# Patient Record
Sex: MALE | Race: WHITE | Employment: FULL TIME | URBAN - METROPOLITAN AREA
[De-identification: names, ages, dates, MRNs, and addresses within clinical notes are randomized per-mention and may not be internally consistent; named-entity substitution may affect disease eponyms.]

---

## 2017-04-05 RX ORDER — LEVOCETIRIZINE DIHYDROCHLORIDE 5 MG/1
TABLET, FILM COATED ORAL
Qty: 90 TABLET | Refills: 1 | Status: SHIPPED | OUTPATIENT
Start: 2017-04-05 | End: 2017-10-17 | Stop reason: SDUPTHER

## 2017-06-08 ENCOUNTER — OFFICE VISIT (OUTPATIENT)
Dept: FAMILY MEDICINE CLINIC | Age: 52
End: 2017-06-08

## 2017-06-08 VITALS
DIASTOLIC BLOOD PRESSURE: 72 MMHG | WEIGHT: 262 LBS | TEMPERATURE: 97.8 F | RESPIRATION RATE: 16 BRPM | HEIGHT: 70 IN | OXYGEN SATURATION: 98 % | HEART RATE: 48 BPM | BODY MASS INDEX: 37.51 KG/M2 | SYSTOLIC BLOOD PRESSURE: 114 MMHG

## 2017-06-08 DIAGNOSIS — G89.29 CHRONIC PAIN OF RIGHT KNEE: ICD-10-CM

## 2017-06-08 DIAGNOSIS — Z12.11 COLON CANCER SCREENING: ICD-10-CM

## 2017-06-08 DIAGNOSIS — H61.92 SKIN LESION OF LEFT EAR: ICD-10-CM

## 2017-06-08 DIAGNOSIS — Z00.00 WELL ADULT EXAM: Primary | ICD-10-CM

## 2017-06-08 DIAGNOSIS — L20.84 INTRINSIC ECZEMA: ICD-10-CM

## 2017-06-08 DIAGNOSIS — J30.1 SEASONAL ALLERGIC RHINITIS DUE TO POLLEN: ICD-10-CM

## 2017-06-08 DIAGNOSIS — M25.561 CHRONIC PAIN OF RIGHT KNEE: ICD-10-CM

## 2017-06-08 DIAGNOSIS — R00.1 BRADYCARDIA: ICD-10-CM

## 2017-06-08 PROCEDURE — 99396 PREV VISIT EST AGE 40-64: CPT | Performed by: FAMILY MEDICINE

## 2017-06-08 ASSESSMENT — PATIENT HEALTH QUESTIONNAIRE - PHQ9
2. FEELING DOWN, DEPRESSED OR HOPELESS: 0
SUM OF ALL RESPONSES TO PHQ9 QUESTIONS 1 & 2: 0
1. LITTLE INTEREST OR PLEASURE IN DOING THINGS: 0
SUM OF ALL RESPONSES TO PHQ QUESTIONS 1-9: 0

## 2017-10-17 RX ORDER — LEVOCETIRIZINE DIHYDROCHLORIDE 5 MG/1
TABLET, FILM COATED ORAL
Qty: 90 TABLET | Refills: 1 | Status: SHIPPED | OUTPATIENT
Start: 2017-10-17 | End: 2018-05-11 | Stop reason: SDUPTHER

## 2018-05-01 ENCOUNTER — HOSPITAL ENCOUNTER (OUTPATIENT)
Dept: ENDOSCOPY | Age: 53
Discharge: HOME | End: 2018-05-01
Attending: INTERNAL MEDICINE | Admitting: INTERNAL MEDICINE

## 2018-05-01 ASSESSMENT — ENCOUNTER SYMPTOMS: SHORTNESS OF BREATH: 0

## 2018-06-08 ENCOUNTER — TELEPHONE (OUTPATIENT)
Dept: FAMILY MEDICINE CLINIC | Age: 53
End: 2018-06-08

## 2018-06-08 RX ORDER — AZITHROMYCIN 250 MG/1
250 TABLET, FILM COATED ORAL DAILY
Qty: 6 TABLET | Refills: 0 | OUTPATIENT
Start: 2018-06-08 | End: 2018-06-13

## 2018-06-08 RX ORDER — ALBUTEROL SULFATE 90 UG/1
2 AEROSOL, METERED RESPIRATORY (INHALATION) EVERY 4 HOURS PRN
Qty: 1 INHALER | Refills: 0 | OUTPATIENT
Start: 2018-06-08 | End: 2019-05-29

## 2018-06-18 ENCOUNTER — TELEPHONE (OUTPATIENT)
Dept: FAMILY MEDICINE CLINIC | Age: 53
End: 2018-06-18

## 2018-06-18 RX ORDER — LEVOCETIRIZINE DIHYDROCHLORIDE 5 MG/1
TABLET, FILM COATED ORAL
Qty: 90 TABLET | Refills: 0 | Status: SHIPPED | OUTPATIENT
Start: 2018-06-18 | End: 2018-09-23 | Stop reason: SDUPTHER

## 2018-06-19 ENCOUNTER — OFFICE VISIT (OUTPATIENT)
Dept: FAMILY MEDICINE CLINIC | Age: 53
End: 2018-06-19

## 2018-06-19 VITALS
DIASTOLIC BLOOD PRESSURE: 72 MMHG | RESPIRATION RATE: 14 BRPM | BODY MASS INDEX: 36.79 KG/M2 | HEIGHT: 70 IN | HEART RATE: 76 BPM | WEIGHT: 257 LBS | SYSTOLIC BLOOD PRESSURE: 120 MMHG

## 2018-06-19 DIAGNOSIS — E55.9 VITAMIN D DEFICIENCY: ICD-10-CM

## 2018-06-19 DIAGNOSIS — H53.47 HEMIANOPSIA: ICD-10-CM

## 2018-06-19 DIAGNOSIS — J30.1 CHRONIC SEASONAL ALLERGIC RHINITIS DUE TO POLLEN: ICD-10-CM

## 2018-06-19 DIAGNOSIS — R06.83 SNORING: ICD-10-CM

## 2018-06-19 DIAGNOSIS — Z12.5 PROSTATE CANCER SCREENING: ICD-10-CM

## 2018-06-19 DIAGNOSIS — J45.20 MILD INTERMITTENT ASTHMA WITHOUT COMPLICATION: ICD-10-CM

## 2018-06-19 DIAGNOSIS — Z00.00 WELL ADULT EXAM: Primary | ICD-10-CM

## 2018-06-19 LAB — PROSTATE SPECIFIC ANTIGEN: 0.39 NG/ML (ref 0–4)

## 2018-06-19 PROCEDURE — 99396 PREV VISIT EST AGE 40-64: CPT | Performed by: FAMILY MEDICINE

## 2018-06-19 PROCEDURE — 36415 COLL VENOUS BLD VENIPUNCTURE: CPT | Performed by: FAMILY MEDICINE

## 2018-06-19 ASSESSMENT — PATIENT HEALTH QUESTIONNAIRE - PHQ9
2. FEELING DOWN, DEPRESSED OR HOPELESS: 0
SUM OF ALL RESPONSES TO PHQ QUESTIONS 1-9: 0
SUM OF ALL RESPONSES TO PHQ9 QUESTIONS 1 & 2: 0
1. LITTLE INTEREST OR PLEASURE IN DOING THINGS: 0

## 2018-09-24 RX ORDER — LEVOCETIRIZINE DIHYDROCHLORIDE 5 MG/1
TABLET, FILM COATED ORAL
Qty: 90 TABLET | Refills: 3 | Status: SHIPPED | OUTPATIENT
Start: 2018-09-24 | End: 2019-09-19 | Stop reason: SDUPTHER

## 2019-05-29 ENCOUNTER — OFFICE VISIT (OUTPATIENT)
Dept: FAMILY MEDICINE CLINIC | Age: 54
End: 2019-05-29
Payer: COMMERCIAL

## 2019-05-29 VITALS
BODY MASS INDEX: 37.34 KG/M2 | OXYGEN SATURATION: 96 % | HEART RATE: 52 BPM | HEIGHT: 70 IN | SYSTOLIC BLOOD PRESSURE: 140 MMHG | DIASTOLIC BLOOD PRESSURE: 82 MMHG | WEIGHT: 260.8 LBS

## 2019-05-29 DIAGNOSIS — I10 ESSENTIAL HYPERTENSION: Primary | ICD-10-CM

## 2019-05-29 PROCEDURE — G8417 CALC BMI ABV UP PARAM F/U: HCPCS | Performed by: NURSE PRACTITIONER

## 2019-05-29 PROCEDURE — 1036F TOBACCO NON-USER: CPT | Performed by: NURSE PRACTITIONER

## 2019-05-29 PROCEDURE — 3017F COLORECTAL CA SCREEN DOC REV: CPT | Performed by: NURSE PRACTITIONER

## 2019-05-29 PROCEDURE — 99213 OFFICE O/P EST LOW 20 MIN: CPT | Performed by: NURSE PRACTITIONER

## 2019-05-29 PROCEDURE — G8427 DOCREV CUR MEDS BY ELIG CLIN: HCPCS | Performed by: NURSE PRACTITIONER

## 2019-05-29 RX ORDER — HYDROCHLOROTHIAZIDE 25 MG/1
25 TABLET ORAL EVERY MORNING
Qty: 30 TABLET | Refills: 0 | Status: SHIPPED | OUTPATIENT
Start: 2019-05-29 | End: 2019-06-20 | Stop reason: SDUPTHER

## 2019-05-29 ASSESSMENT — PATIENT HEALTH QUESTIONNAIRE - PHQ9
SUM OF ALL RESPONSES TO PHQ QUESTIONS 1-9: 0
2. FEELING DOWN, DEPRESSED OR HOPELESS: 0
SUM OF ALL RESPONSES TO PHQ9 QUESTIONS 1 & 2: 0
1. LITTLE INTEREST OR PLEASURE IN DOING THINGS: 0
SUM OF ALL RESPONSES TO PHQ QUESTIONS 1-9: 0

## 2019-05-29 NOTE — PROGRESS NOTES
SUBJECTIVE:    Patient ID: Richelle Lo is a 48 y.o. y.o. male. HPI   Chief Complaint   Patient presents with    Hypertension     PT STATES HIS BP HAS BEEN RUNNING HIGH ACCORDING TO HIS WIFE,      HE CHECKED HIS B/P AT [facility] AND IT WAS HIGH 156/90 IN January HIS WIFE CHECKED 4/19 AND IT WAS ELEVATED AS WELL- HE HAS NOT HAD ANY C/O CP - HEADACHE - VISION CHANGES - NO REGULAR EXERCISE AND HE KNOWS THAT HE CAN LOSE ABOUT 40 POUNDS AND THAT MAY HELP    Review of Systems   Eyes: Negative for visual disturbance. Cardiovascular: Negative for chest pain, palpitations and leg swelling. Neurological: Negative for headaches. All other systems reviewed and are negative. OBJECTIVE  Vitals:    05/29/19 1454   BP: (!) 140/82   Pulse:    SpO2:         Physical Exam   Constitutional: Vital signs are normal. He appears well-developed and well-nourished. He is active. Cardiovascular: Normal rate, regular rhythm, S1 normal, S2 normal and normal heart sounds. Exam reveals no gallop, no S3, no S4, no distant heart sounds and no friction rub. No murmur heard. No systolic murmur is present. No diastolic murmur is present. Pulmonary/Chest: Effort normal and breath sounds normal.   Neurological: He is alert. Psychiatric: He has a normal mood and affect. His speech is normal and behavior is normal. Judgment and thought content normal. Cognition and memory are normal.     Marcus Mendez was seen today for hypertension. Diagnoses and all orders for this visit:    Essential hypertension  hydrochlorothiazide (HYDRODIURIL) 25 MG tablet;  Take 1 tablet by mouth every morning  PT IS INSTRUCTED TO MONITOR B/P AT  LEAST WEEKLY GOAL /90 OR LESS - IF TRENDING UP HE IS INSTRUCTED TO FOLLOW UP SOONER THAN 4 WEEKS  DASH DIET REVIEWED WITH PT AS WELL HE IS ENCOURAGED TO INCREASE PHYSICAL ACTIVITY TO AT LEAST 30 MIN  4-5 X WEEKLY

## 2019-05-29 NOTE — PATIENT INSTRUCTIONS
Patient Education        DASH Diet: Care Instructions  Your Care Instructions    The DASH diet is an eating plan that can help lower your blood pressure. DASH stands for Dietary Approaches to Stop Hypertension. Hypertension is high blood pressure. The DASH diet focuses on eating foods that are high in calcium, potassium, and magnesium. These nutrients can lower blood pressure. The foods that are highest in these nutrients are fruits, vegetables, low-fat dairy products, nuts, seeds, and legumes. But taking calcium, potassium, and magnesium supplements instead of eating foods that are high in those nutrients does not have the same effect. The DASH diet also includes whole grains, fish, and poultry. The DASH diet is one of several lifestyle changes your doctor may recommend to lower your high blood pressure. Your doctor may also want you to decrease the amount of sodium in your diet. Lowering sodium while following the DASH diet can lower blood pressure even further than just the DASH diet alone. Follow-up care is a key part of your treatment and safety. Be sure to make and go to all appointments, and call your doctor if you are having problems. It's also a good idea to know your test results and keep a list of the medicines you take. How can you care for yourself at home? Following the DASH diet  · Eat 4 to 5 servings of fruit each day. A serving is 1 medium-sized piece of fruit, ½ cup chopped or canned fruit, 1/4 cup dried fruit, or 4 ounces (½ cup) of fruit juice. Choose fruit more often than fruit juice. · Eat 4 to 5 servings of vegetables each day. A serving is 1 cup of lettuce or raw leafy vegetables, ½ cup of chopped or cooked vegetables, or 4 ounces (½ cup) of vegetable juice. Choose vegetables more often than vegetable juice. · Get 2 to 3 servings of low-fat and fat-free dairy each day. A serving is 8 ounces of milk, 1 cup of yogurt, or 1 ½ ounces of cheese. · Eat 6 to 8 servings of grains each day. meals using beans and peas. Add garbanzo or kidney beans to salads. Make burritos and tacos with mashed salas beans or black beans. Where can you learn more? Go to https://chpedavideb.[employer]. org and sign in to your [employer] account. Enter F270 in the [employer] box to learn more about \"DASH Diet: Care Instructions. \"     If you do not have an account, please click on the \"Sign Up Now\" link. Current as of: July 22, 2018  Content Version: 12.0  © 2328-8340 [employer]. Care instructions adapted under license by [employer] ([facility]). If you have questions about a medical condition or this instruction, always ask your healthcare professional. [address] any warranty or liability for your use of this information. Patient Education         High Blood Pressure: The DASH Diet (02:03)  Your health professional recommends that you watch this short online health video. Learn how the DASH eating plan can help lower your blood pressure. How to watch the video    Scan the QR code   OR Visit the website    [email or web address]-umusi. se/r/Mx9gff369yltq   Current as of: July 22, 2018  Content Version: 12.0  © 2006-2019 [employer]. Care instructions adapted under license by [employer] ([facility]). If you have questions about a medical condition or this instruction, always ask your healthcare professional. [address] any warranty or liability for your use of this information.

## 2019-06-20 RX ORDER — HYDROCHLOROTHIAZIDE 25 MG/1
TABLET ORAL
Qty: 30 TABLET | Refills: 0 | Status: SHIPPED | OUTPATIENT
Start: 2019-06-20 | End: 2020-05-29 | Stop reason: ALTCHOICE

## 2019-06-26 ENCOUNTER — OFFICE VISIT (OUTPATIENT)
Dept: FAMILY MEDICINE CLINIC | Age: 54
End: 2019-06-26
Payer: COMMERCIAL

## 2019-06-26 VITALS
RESPIRATION RATE: 18 BRPM | WEIGHT: 251 LBS | DIASTOLIC BLOOD PRESSURE: 64 MMHG | SYSTOLIC BLOOD PRESSURE: 100 MMHG | HEIGHT: 70 IN | HEART RATE: 50 BPM | BODY MASS INDEX: 35.93 KG/M2

## 2019-06-26 DIAGNOSIS — R03.0 ELEVATED BP WITHOUT DIAGNOSIS OF HYPERTENSION: Primary | ICD-10-CM

## 2019-06-26 DIAGNOSIS — J30.1 ALLERGIC RHINITIS DUE TO POLLEN, UNSPECIFIED SEASONALITY: ICD-10-CM

## 2019-06-26 DIAGNOSIS — N52.02 CORPORO-VENOUS OCCLUSIVE ERECTILE DYSFUNCTION: ICD-10-CM

## 2019-06-26 PROCEDURE — G8417 CALC BMI ABV UP PARAM F/U: HCPCS | Performed by: FAMILY MEDICINE

## 2019-06-26 PROCEDURE — 1036F TOBACCO NON-USER: CPT | Performed by: FAMILY MEDICINE

## 2019-06-26 PROCEDURE — 3017F COLORECTAL CA SCREEN DOC REV: CPT | Performed by: FAMILY MEDICINE

## 2019-06-26 PROCEDURE — 99213 OFFICE O/P EST LOW 20 MIN: CPT | Performed by: FAMILY MEDICINE

## 2019-06-26 PROCEDURE — G8428 CUR MEDS NOT DOCUMENT: HCPCS | Performed by: FAMILY MEDICINE

## 2019-06-26 NOTE — PROGRESS NOTES
Subjective:      Patient ID: Maegan Ceballos is a 48 y.o. male. HPI  Chief Complaint   Patient presents with    Hypertension     1 MO ELEVATED BLOOD PRESSURE ROUTINE FOLLOW UP      Gone for 1 week to [employer]  - scuba diving in [facility]  Allergies good control - taking xyzal daily  On hctz for bp  BP Readings from Last 3 Encounters:   06/26/19 100/64   05/29/19 (!) 140/82   06/19/18 120/72     Pulse Readings from Last 3 Encounters:   06/26/19 50   05/29/19 52   06/19/18 76     Wt Readings from Last 3 Encounters:   06/26/19 251 lb (113.9 kg)   05/29/19 260 lb 12.8 oz (118.3 kg)   06/19/18 257 lb (116.6 kg)   started on hctz for bp  Exercising daily -   Interested in [address] [place] [address],[address] for ED - recent issue - feels like needs help  Review of Systems    Objective:   Physical Exam   Constitutional: He appears well-developed. No distress. HENT:   Mouth/Throat: Oropharynx is clear and moist.   Eyes: Conjunctivae are normal. No scleral icterus. Cardiovascular: Normal rate, regular rhythm and normal heart sounds. Exam reveals no gallop. No murmur heard. Pulmonary/Chest: Effort normal and breath sounds normal. No respiratory distress. He has no wheezes. He has no rhonchi. He has no rales. Abdominal: Soft. Bowel sounds are normal. He exhibits no distension. There is no tenderness. Musculoskeletal: He exhibits no edema. Neurological: He is alert. Skin: Skin is intact. No rash noted. No erythema. Psychiatric: He has a normal mood and affect. Assessment:       Diagnosis Orders   1. Elevated BP without diagnosis of hypertension     2. Corporo-venous occlusive erectile dysfunction     3.  Allergic rhinitis due to pollen, unspecified seasonality             Plan:      Monitor bp - precautions d/ w pt including low salt/ caffeine moderation/ diet/ exercise w/ low fat / increased fruit / veggies d/w pt  Lifestyle changes d/w pt  Hold on hctz  F/u in next 3 months w/ bp readings - or at PE later in year as long as bp stays <140/90 at rest  xyzal for allergies working well  viagra rx handwritten 2/2 to computer freezing  Se dw pt        Tino Schuster MD

## 2019-09-19 RX ORDER — LEVOCETIRIZINE DIHYDROCHLORIDE 5 MG/1
TABLET, FILM COATED ORAL
Qty: 90 TABLET | Refills: 1 | Status: SHIPPED | OUTPATIENT
Start: 2019-09-19 | End: 2020-04-09 | Stop reason: SDUPTHER

## 2020-04-13 RX ORDER — LEVOCETIRIZINE DIHYDROCHLORIDE 5 MG/1
5 TABLET, FILM COATED ORAL NIGHTLY
Qty: 90 TABLET | Refills: 0 | Status: SHIPPED | OUTPATIENT
Start: 2020-04-13 | End: 2020-07-08

## 2020-05-29 ENCOUNTER — TELEMEDICINE (OUTPATIENT)
Dept: FAMILY MEDICINE CLINIC | Age: 55
End: 2020-05-29
Payer: COMMERCIAL

## 2020-05-29 VITALS — TEMPERATURE: 97.4 F | BODY MASS INDEX: 35.79 KG/M2 | WEIGHT: 250 LBS | HEIGHT: 70 IN

## 2020-05-29 PROCEDURE — G8427 DOCREV CUR MEDS BY ELIG CLIN: HCPCS | Performed by: NURSE PRACTITIONER

## 2020-05-29 PROCEDURE — 3017F COLORECTAL CA SCREEN DOC REV: CPT | Performed by: NURSE PRACTITIONER

## 2020-05-29 PROCEDURE — 99213 OFFICE O/P EST LOW 20 MIN: CPT | Performed by: NURSE PRACTITIONER

## 2020-05-29 RX ORDER — SILDENAFIL 100 MG/1
1 TABLET, FILM COATED ORAL DAILY PRN
COMMUNITY
Start: 2020-04-06 | End: 2020-11-04 | Stop reason: SDUPTHER

## 2020-05-29 RX ORDER — CEPHALEXIN 500 MG/1
500 CAPSULE ORAL 2 TIMES DAILY
Qty: 14 CAPSULE | Refills: 0 | Status: SHIPPED | OUTPATIENT
Start: 2020-05-29 | End: 2020-06-05

## 2020-05-29 ASSESSMENT — PATIENT HEALTH QUESTIONNAIRE - PHQ9
SUM OF ALL RESPONSES TO PHQ QUESTIONS 1-9: 0
1. LITTLE INTEREST OR PLEASURE IN DOING THINGS: 0
SUM OF ALL RESPONSES TO PHQ QUESTIONS 1-9: 0
SUM OF ALL RESPONSES TO PHQ9 QUESTIONS 1 & 2: 0
2. FEELING DOWN, DEPRESSED OR HOPELESS: 0

## 2020-07-08 RX ORDER — LEVOCETIRIZINE DIHYDROCHLORIDE 5 MG/1
TABLET, FILM COATED ORAL
Qty: 90 TABLET | Refills: 0 | Status: SHIPPED | OUTPATIENT
Start: 2020-07-08 | End: 2020-11-04 | Stop reason: SDUPTHER

## 2020-10-06 RX ORDER — LEVOCETIRIZINE DIHYDROCHLORIDE 5 MG/1
TABLET, FILM COATED ORAL
Qty: 90 TABLET | Refills: 0 | OUTPATIENT
Start: 2020-10-06

## 2020-10-06 NOTE — TELEPHONE ENCOUNTER
LAST VISIT 05/29/2020. NEXT VISIT NONE. PT IS DUE FOR AN APPT     CALLED AND SPOKE WITH PT. HE STATED THAT HE DOES NOT NEED AT THIS TIME.  HS

## 2020-11-04 ENCOUNTER — OFFICE VISIT (OUTPATIENT)
Dept: FAMILY MEDICINE CLINIC | Age: 55
End: 2020-11-04
Payer: COMMERCIAL

## 2020-11-04 VITALS
BODY MASS INDEX: 37.42 KG/M2 | SYSTOLIC BLOOD PRESSURE: 118 MMHG | HEIGHT: 70 IN | DIASTOLIC BLOOD PRESSURE: 66 MMHG | OXYGEN SATURATION: 98 % | HEART RATE: 47 BPM | TEMPERATURE: 98.2 F | WEIGHT: 261.4 LBS

## 2020-11-04 PROCEDURE — 90471 IMMUNIZATION ADMIN: CPT | Performed by: NURSE PRACTITIONER

## 2020-11-04 PROCEDURE — 90686 IIV4 VACC NO PRSV 0.5 ML IM: CPT | Performed by: NURSE PRACTITIONER

## 2020-11-04 PROCEDURE — 99396 PREV VISIT EST AGE 40-64: CPT | Performed by: NURSE PRACTITIONER

## 2020-11-04 RX ORDER — LEVOCETIRIZINE DIHYDROCHLORIDE 5 MG/1
TABLET, FILM COATED ORAL
Qty: 90 TABLET | Refills: 3 | Status: SHIPPED | OUTPATIENT
Start: 2020-11-04 | End: 2021-11-03 | Stop reason: SDUPTHER

## 2020-11-04 RX ORDER — SILDENAFIL 100 MG/1
100 TABLET, FILM COATED ORAL DAILY PRN
Qty: 30 TABLET | Refills: 1 | Status: SHIPPED | OUTPATIENT
Start: 2020-11-04 | End: 2021-11-03 | Stop reason: SDUPTHER

## 2020-11-04 NOTE — PROGRESS NOTES
Vaccine Information Sheet, \"Influenza - Inactivated\"  given to Chepe Pope, or parent/legal guardian of  Chepe Pope and verbalized understanding. Patient responses:    Have you ever had a reaction to a flu vaccine? No  Do you have any current illness? No  Have you ever had Guillian [place] Syndrome? No  Do you have a serious allergy to any of the follow: Neomycin, Polymyxin, Thimerosal, eggs or egg products? No    Flu vaccine given per order. Please see immunization tab. Risks and benefits explained. Current VIS given.       Immunizations Administered     Name Date Dose Route    Influenza, Quadv, IM, PF (6 mo and older Fluzone, Flulaval, Fluarix, and 3 yrs and older Afluria) 11/4/2020 0.5 mL Intramuscular    Site: Deltoid- Left    Lot: [id number]    NDC: 70092-550-50

## 2020-11-04 NOTE — PROGRESS NOTES
2020    Christian Chung (:  1965) is a 50 y.o. male, here for a preventive medicine evaluation. Leslie Laird is here for routine physical. Denies any complaints. He works as an  for [employer]. They complete lab work for insurance at work every year. Continues to use Xyzal for allergies as well as sildenafil. Patient Active Problem List   Diagnosis    Eczema    Allergic rhinitis    Knee pain, right    Ulcer    Scrotal skin lesion    Bradycardia    Tear of meniscus of knee       Review of Systems   Constitutional: Negative for chills and fever. HENT: Negative for ear discharge, ear pain, hearing loss, sinus pressure, sinus pain and sore throat. Eyes: Negative for pain, discharge and redness. Respiratory: Negative for cough, shortness of breath and wheezing. Cardiovascular: Negative for chest pain and palpitations. Gastrointestinal: Negative for abdominal distention, abdominal pain, diarrhea, nausea and vomiting. Genitourinary: Negative for dysuria and hematuria. Musculoskeletal: Negative for myalgias. Skin: Negative for rash. Neurological: Negative for weakness, numbness and headaches. Prior to Visit Medications    Medication Sig Taking? Authorizing Provider   levocetirizine (XYZAL) 5 MG tablet TAKE 1 TABLET BY MOUTH EVERY DAY AT NIGHT Yes TALIA Mcallister CNP   sildenafil (VIAGRA) 100 MG tablet Take 1 tablet by mouth daily as needed for Erectile Dysfunction Yes TALIA Mcallister CNP        No Known Allergies    Past Medical History:   Diagnosis Date    Colon polyp 2018    COLONOSCOPY, DR DEDE JEONG, SIGMOID COLON POLYP:  COLONIC MUCOSA WITH LYMPHOCYTIC AGGREGATES.  Rectal polyp 2018    COLONOSCOPY, DR DEDE JEONG, SIGMOID COLON POLYP:  COLONIC MUCOSA WITH LYMPHOCYTIC AGGREGATES. RECTUM POLYP:  MUCOSAL PROLAPSE POLYP.     Ulcer     stomach       Past Surgical History:   Procedure Laterality Date    COLONOSCOPY  2018 COLONOSCOPY, DR DEDE JEONG, SIGMOID COLON POLYP:  COLONIC MUCOSA WITH LYMPHOCYTIC AGGREGATES. RECTUM POLYP:  MUCOSAL PROLAPSE POLYP.    EYE SURGERY      vision correction surgery: lazy eye    KNEE ARTHROSCOPY  2012    right knee    TONSILLECTOMY      as a child        Social History     Socioeconomic History    Marital status:      Spouse name: Not on file    Number of children: Not on file    Years of education: Not on file    Highest education level: Not on file   Occupational History    Not on file   Social Needs    Financial resource strain: Not on file    Food insecurity     Worry: Not on file     Inability: Not on file    Transportation needs     Medical: Not on file     Non-medical: Not on file   Tobacco Use    Smoking status: Former Smoker     Packs/day: 1.00     Years: 10.00     Pack years: 10.00     Types: Cigarettes    Smokeless tobacco: Never Used    Tobacco comment: Quit 20 years ago; Educate not to restart. Substance and Sexual Activity    Alcohol use:  Yes     Alcohol/week: 0.0 standard drinks     Comment: occasionally     Drug use: No    Sexual activity: Not on file   Lifestyle    Physical activity     Days per week: Not on file     Minutes per session: Not on file    Stress: Not on file   Relationships    Social connections     Talks on phone: Not on file     Gets together: Not on file     Attends Orthodoxy service: Not on file     Active member of club or organization: Not on file     Attends meetings of clubs or organizations: Not on file     Relationship status: Not on file    Intimate partner violence     Fear of current or ex partner: Not on file     Emotionally abused: Not on file     Physically abused: Not on file     Forced sexual activity: Not on file   Other Topics Concern    Not on file   Social History Narrative    Not on file        Family History   Problem Relation Age of Onset    Diabetes Mother     Asthma Mother     Diabetes Father     Heart Attack Maternal Grandmother     Heart Attack Maternal Grandfather     Cancer Paternal Grandmother         lung    Cancer Paternal Grandfather         pancreatic       ADVANCE DIRECTIVE: N, <no information>    Vitals:    11/04/20 1616   BP: 118/66   Site: Left Upper Arm   Position: Sitting   Cuff Size: Large Adult   Pulse: (!) 47   Temp: 98.2 °F (36.8 °C)   TempSrc: Tympanic   SpO2: 98%   Weight: 261 lb 6.4 oz (118.6 kg)  Comment: SHOES ON   Height: 5' 10\" (1.778 m)     Estimated body mass index is 37.51 kg/m² as calculated from the following:    Height as of this encounter: 5' 10\" (1.778 m). Weight as of this encounter: 261 lb 6.4 oz (118.6 kg). Physical Exam  Vitals signs reviewed. Constitutional:       Appearance: Normal appearance. He is obese. HENT:      Head: Normocephalic and atraumatic. Right Ear: Tympanic membrane, ear canal and external ear normal.      Left Ear: Tympanic membrane, ear canal and external ear normal.      Nose: Nose normal.      Mouth/Throat:      Mouth: Mucous membranes are moist.      Pharynx: Oropharynx is clear. No posterior oropharyngeal erythema. Eyes:      General: No scleral icterus. Right eye: No discharge. Left eye: No discharge. Extraocular Movements: Extraocular movements intact. Pupils: Pupils are equal, round, and reactive to light. Neck:      Musculoskeletal: Normal range of motion and neck supple. Cardiovascular:      Rate and Rhythm: Normal rate and regular rhythm. Pulses: Normal pulses. Heart sounds: Normal heart sounds. Pulmonary:      Effort: Pulmonary effort is normal.      Breath sounds: Normal breath sounds. No wheezing. Abdominal:      General: Bowel sounds are normal.      Palpations: Abdomen is soft. Tenderness: There is no abdominal tenderness. There is no guarding or rebound. Musculoskeletal: Normal range of motion. Skin:     General: Skin is warm and dry.       Capillary Refill: Capillary refill takes less than 2 seconds. Neurological:      Mental Status: He is alert and oriented to person, place, and time. Mental status is at baseline. Psychiatric:         Mood and Affect: Mood normal.         Behavior: Behavior normal.         No flowsheet data found. No results found for: CHOL, CHOLFAST, TRIG, TRIGLYCFAST, HDL, LDLCHOLESTEROL, LDLCALC, GLUF, GLUCOSE, LABA1C    The ASCVD Risk score (Darcy Cerna, et al., 2013) failed to calculate for the following reasons:    Cannot find a previous HDL lab    Cannot find a previous total cholesterol lab    Immunization History   Administered Date(s) Administered    Influenza Vaccine, unspecified formulation 11/21/2016    Influenza Virus Vaccine 11/01/2013, 10/03/2014, 11/21/2016    Influenza Whole 11/01/2013, 10/03/2014    Influenza, Quadv, IM, PF (6 mo and older Fluzone, Flulaval, Fluarix, and 3 yrs and older Afluria) 11/04/2020    Tdap (Boostrix, Adacel) 04/11/2014       Health Maintenance   Topic Date Due    HIV screen  08/26/1982    Lipid screen  08/26/2007    Diabetes screen  08/26/2007    Shingles Vaccine (1 of 2) 08/26/2017    DTaP/Tdap/Td vaccine (2 - Td) 04/11/2024    Colon cancer screen colonoscopy  05/01/2028    Flu vaccine  Completed    Hepatitis A vaccine  Aged Out    Hepatitis B vaccine  Aged Out    Hib vaccine  Aged Out    Meningococcal (ACWY) vaccine  Aged Out    Pneumococcal 0-64 years Vaccine  Aged Out       ASSESSMENT/PLAN:  1. Annual visit for general adult medical examination without abnormal findings  - Normal exam    2. Need for influenza vaccination  - INFLUENZA, QUADV, 3 YRS AND OLDER, IM PF, PREFILL SYR OR SDV, 0.5ML (AFLURIA QUADV, PF)    3. Seasonal allergic rhinitis, unspecified trigger  - levocetirizine (XYZAL) 5 MG tablet; TAKE 1 TABLET BY MOUTH EVERY DAY AT NIGHT  Dispense: 90 tablet; Refill: 3    4. Erectile dysfunction, unspecified erectile dysfunction type  - sildenafil (VIAGRA) 100 MG tablet;  Take 1 tablet by mouth daily as needed for Erectile Dysfunction  Dispense: 30 tablet; Refill: 1      Return in about 1 year (around 11/4/2021) for Annual Physical.    An electronic signature was used to authenticate this note.     --TALIA Diaz - CNP on 11/5/2020 at 7:33 AM

## 2020-11-05 ASSESSMENT — ENCOUNTER SYMPTOMS
DIARRHEA: 0
SHORTNESS OF BREATH: 0
WHEEZING: 0
ABDOMINAL PAIN: 0
SINUS PAIN: 0
EYE REDNESS: 0
EYE PAIN: 0
COUGH: 0
NAUSEA: 0
ABDOMINAL DISTENTION: 0
EYE DISCHARGE: 0
SORE THROAT: 0
SINUS PRESSURE: 0
VOMITING: 0

## 2021-07-19 ENCOUNTER — E-VISIT (OUTPATIENT)
Dept: FAMILY MEDICINE CLINIC | Age: 56
End: 2021-07-19
Payer: COMMERCIAL

## 2021-07-19 DIAGNOSIS — H10.32 ACUTE CONJUNCTIVITIS OF LEFT EYE, UNSPECIFIED ACUTE CONJUNCTIVITIS TYPE: Primary | ICD-10-CM

## 2021-07-19 PROCEDURE — 98971 NQHP OL DIG ASSMT&MGMT 11-20: CPT | Performed by: NURSE PRACTITIONER

## 2021-07-19 RX ORDER — POLYMYXIN B SULFATE AND TRIMETHOPRIM 1; 10000 MG/ML; [USP'U]/ML
1 SOLUTION OPHTHALMIC EVERY 4 HOURS
Qty: 10 ML | Refills: 0 | Status: SHIPPED | OUTPATIENT
Start: 2021-07-19 | End: 2021-07-29

## 2021-11-03 ENCOUNTER — TELEPHONE (OUTPATIENT)
Dept: FAMILY MEDICINE CLINIC | Age: 56
End: 2021-11-03

## 2021-11-03 ENCOUNTER — OFFICE VISIT (OUTPATIENT)
Dept: FAMILY MEDICINE CLINIC | Age: 56
End: 2021-11-03
Payer: COMMERCIAL

## 2021-11-03 VITALS
SYSTOLIC BLOOD PRESSURE: 124 MMHG | BODY MASS INDEX: 37.94 KG/M2 | RESPIRATION RATE: 20 BRPM | OXYGEN SATURATION: 95 % | HEART RATE: 91 BPM | HEIGHT: 70 IN | DIASTOLIC BLOOD PRESSURE: 82 MMHG | WEIGHT: 265 LBS

## 2021-11-03 DIAGNOSIS — N52.9 ERECTILE DYSFUNCTION, UNSPECIFIED ERECTILE DYSFUNCTION TYPE: ICD-10-CM

## 2021-11-03 DIAGNOSIS — J30.2 SEASONAL ALLERGIC RHINITIS, UNSPECIFIED TRIGGER: ICD-10-CM

## 2021-11-03 DIAGNOSIS — Z23 NEED FOR IMMUNIZATION AGAINST INFLUENZA: ICD-10-CM

## 2021-11-03 DIAGNOSIS — Z23 NEED FOR SHINGLES VACCINE: ICD-10-CM

## 2021-11-03 DIAGNOSIS — Z00.00 ANNUAL VISIT FOR GENERAL ADULT MEDICAL EXAMINATION WITHOUT ABNORMAL FINDINGS: Primary | ICD-10-CM

## 2021-11-03 PROCEDURE — 90471 IMMUNIZATION ADMIN: CPT | Performed by: NURSE PRACTITIONER

## 2021-11-03 PROCEDURE — 99396 PREV VISIT EST AGE 40-64: CPT | Performed by: NURSE PRACTITIONER

## 2021-11-03 PROCEDURE — 90674 CCIIV4 VAC NO PRSV 0.5 ML IM: CPT | Performed by: NURSE PRACTITIONER

## 2021-11-03 PROCEDURE — G8482 FLU IMMUNIZE ORDER/ADMIN: HCPCS | Performed by: NURSE PRACTITIONER

## 2021-11-03 PROCEDURE — 90750 HZV VACC RECOMBINANT IM: CPT | Performed by: NURSE PRACTITIONER

## 2021-11-03 PROCEDURE — 90472 IMMUNIZATION ADMIN EACH ADD: CPT | Performed by: NURSE PRACTITIONER

## 2021-11-03 RX ORDER — FLUTICASONE PROPIONATE 50 MCG
2 SPRAY, SUSPENSION (ML) NASAL DAILY
Qty: 16 G | Refills: 0 | Status: SHIPPED | OUTPATIENT
Start: 2021-11-03 | End: 2021-11-29

## 2021-11-03 RX ORDER — LEVOCETIRIZINE DIHYDROCHLORIDE 5 MG/1
TABLET, FILM COATED ORAL
Qty: 90 TABLET | Refills: 3 | Status: SHIPPED | OUTPATIENT
Start: 2021-11-03 | End: 2022-03-14

## 2021-11-03 RX ORDER — SILDENAFIL 100 MG/1
100 TABLET, FILM COATED ORAL DAILY PRN
Qty: 30 TABLET | Refills: 1 | Status: SHIPPED | OUTPATIENT
Start: 2021-11-03

## 2021-11-03 ASSESSMENT — ENCOUNTER SYMPTOMS
EYE PAIN: 0
VOMITING: 0
NAUSEA: 0
EYE DISCHARGE: 0
ABDOMINAL PAIN: 0
COUGH: 0
EYE REDNESS: 0
SINUS PAIN: 0
SINUS PRESSURE: 0
WHEEZING: 0
ABDOMINAL DISTENTION: 0
SORE THROAT: 0
DIARRHEA: 0
SHORTNESS OF BREATH: 0

## 2021-11-03 ASSESSMENT — PATIENT HEALTH QUESTIONNAIRE - PHQ9
SUM OF ALL RESPONSES TO PHQ9 QUESTIONS 1 & 2: 0
SUM OF ALL RESPONSES TO PHQ QUESTIONS 1-9: 0
2. FEELING DOWN, DEPRESSED OR HOPELESS: 0
SUM OF ALL RESPONSES TO PHQ QUESTIONS 1-9: 0
SUM OF ALL RESPONSES TO PHQ QUESTIONS 1-9: 0
1. LITTLE INTEREST OR PLEASURE IN DOING THINGS: 0

## 2021-11-03 NOTE — PROGRESS NOTES
Lilia Bonilla (:  1965) is a 49 y.o. male,Established patient, here for evaluation of the following chief complaint(s): Annual Exam (WORK PHYSICAL) and Other (PT NOT FASTING FOR BLOOD WORK TODAY)      ASSESSMENT/PLAN:  1. Annual visit for general adult medical examination without abnormal findings  -Normal exam today  -Requested that patient send lab results from [facility] once they are completed  -Shingles and flu vaccines today  -Refill medications. Continue as ordered  -Follow-up in 1 year for physical, or sooner if needed  2. Seasonal allergic rhinitis, unspecified trigger  -Typically controlled with Xyzal.  Refill today. Flonase is being sent for when ear congestion develops. Educated on medication use as well as side effects. Follow-up as needed. -     levocetirizine (XYZAL) 5 MG tablet; TAKE 1 TABLET BY MOUTH EVERY DAY AT NIGHT, Disp-90 tablet, R-3Normal  -     fluticasone (FLONASE) 50 MCG/ACT nasal spray; 2 sprays by Each Nostril route daily, Disp-16 g, R-0Normal  3. Erectile dysfunction, unspecified erectile dysfunction type  -Controlled on sildenafil. Refill today. -     sildenafil (VIAGRA) 100 MG tablet; Take 1 tablet by mouth daily as needed for Erectile Dysfunction, Disp-30 tablet, R-1Normal  4. Need for immunization against influenza  -     INFLUENZA, MDCK QUADV, 2 YRS AND OLDER, IM, PF, PREFILL SYR OR SDV, 0.5ML (FLUCELVAX QUADV, PF)  5. Need for shingles vaccine  -     Zoster Subunit [facility])      Return in about 1 year (around 11/3/2022), or Shingles #2 in 2-6 months, for Annual Physical.    SUBJECTIVE/OBJECTIVE:  AMAYA  José Antonio Meier presents today for his annual physical.  Denies any particular complaints today. States that medications continue to work well for him. He does state that despite the Xyzal he will occasionally get congestion in his ears easily. He states this especially happens when he goes on a snorkeling trip swollen goes to a place where the weather changes drastically. equal, round, and reactive to light. Cardiovascular:      Rate and Rhythm: Normal rate and regular rhythm. Pulses: Normal pulses. Heart sounds: Normal heart sounds. Pulmonary:      Effort: Pulmonary effort is normal.      Breath sounds: Normal breath sounds. No wheezing. Abdominal:      General: Bowel sounds are normal.      Palpations: Abdomen is soft. Tenderness: There is no abdominal tenderness. There is no guarding or rebound. Musculoskeletal:         General: Normal range of motion. Cervical back: Normal range of motion and neck supple. Skin:     General: Skin is warm and dry. Capillary Refill: Capillary refill takes less than 2 seconds. Neurological:      Mental Status: He is alert and oriented to person, place, and time. Mental status is at baseline. Psychiatric:         Mood and Affect: Mood normal.         Behavior: Behavior normal.         Thought Content: Thought content normal.         Judgment: Judgment normal.               This dictation was generated by voice recognition computer software. Although all attempts are made to edit the dictation for accuracy, there may be errors in the transcription that are not intended. An electronic signature was used to authenticate this note.     --TALIA Cotter - CNP

## 2021-11-03 NOTE — PROGRESS NOTES
Vaccine Information Sheet, \"Influenza - Inactivated\"  given to Derryl Hatchet, or parent/legal guardian of  Derryl Hatchet and verbalized understanding. Patient responses:    Have you ever had a reaction to a flu vaccine? No  Do you have any current illness? No  Have you ever had Guillian [place] Syndrome? No  Do you have a serious allergy to any of the follow: Neomycin, Polymyxin, Thimerosal, eggs or egg products? No    Flu vaccine given per order. Please see immunization tab. Risks and benefits explained. Current VIS given.       Immunizations Administered     Name Date Dose Route    Influenza, MDCK Quadv, IM, PF (Flucelvax 2 yrs and older) 11/3/2021 0.5 mL Intramuscular    Site: Deltoid- Left    Lot: [id number]    NDC: 27426-010-61    Zoster Recombinant (Shingrix) 11/3/2021 0.5 mL Intramuscular    Site: Right arm    Lot: [id number]    NDC: 66155-110-38

## 2021-11-03 NOTE — PATIENT INSTRUCTIONS
Patient Education        Well Visit, Men 48 to 72: Care Instructions  Overview     Well visits can help you stay healthy. Your doctor has checked your overall health and may have suggested ways to take good care of yourself. Your doctor also may have recommended tests. At home, you can help prevent illness with healthy eating, regular exercise, and other steps. Follow-up care is a key part of your treatment and safety. Be sure to make and go to all appointments, and call your doctor if you are having problems. It's also a good idea to know your test results and keep a list of the medicines you take. How can you care for yourself at home? · Get screening tests that you and your doctor decide on. Screening helps find diseases before any symptoms appear. · Eat healthy foods. Choose fruits, vegetables, whole grains, protein, and low-fat dairy foods. Limit fat, especially saturated fat. Reduce salt in your diet. · Limit alcohol. Have no more than 2 drinks a day or 14 drinks a week. · Get at least 30 minutes of exercise on most days of the week. Walking is a good choice. You also may want to do other activities, such as running, swimming, cycling, or playing tennis or team sports. · Reach and stay at a healthy weight. This will lower your risk for many problems, such as obesity, diabetes, heart disease, and high blood pressure. · Do not smoke. Smoking can make health problems worse. If you need help quitting, talk to your doctor about stop-smoking programs and medicines. These can increase your chances of quitting for good. · Care for your mental health. It is easy to get weighed down by worry and stress. Learn strategies to manage stress, like deep breathing and mindfulness, and stay connected with your family and community. If you find you often feel sad or hopeless, talk with your doctor. Treatment can help.   · Talk to your doctor about whether you have any risk factors for sexually transmitted infections (STIs). You can help prevent STIs if you wait to have sex with a new partner (or partners) until you've each been tested for STIs. It also helps if you use condoms (male or female condoms) and if you limit your sex partners to one person who only has sex with you. Vaccines are available for some STIs. · If it's important to you to prevent pregnancy with your partner, talk with your doctor about birth control options that might be best for you. · If you think you may have a problem with alcohol or drug use, talk to your doctor. This includes prescription medicines (such as amphetamines and opioids) and illegal drugs (such as cocaine and methamphetamine). Your doctor can help you figure out what type of treatment is best for you. · Protect your skin from too much sun. When you're outdoors from 10 a.m. to 4 p.m., stay in the shade or cover up with clothing and a hat with a wide brim. Wear sunglasses that block UV rays. Even when it's cloudy, put broad-spectrum sunscreen (SPF 30 or higher) on any exposed skin. · See a dentist one or two times a year for checkups and to have your teeth cleaned. · Wear a seat belt in the car. When should you call for help? Watch closely for changes in your health, and be sure to contact your doctor if you have any problems or symptoms that concern you. Where can you learn more? Go to [email or web address][email or web address] org and sign in to your [employer] account. Enter N370 in the [facility] box to learn more about \"Well Visit, Men 48 to 72: Care Instructions. \"     If you do not have an account, please click on the \"Sign Up Now\" link. Current as of: February 11, 2021               Content Version: 13.0  © 1814-4395 Healthwise, Incorporated. Care instructions adapted under license by [facility] ([facility]).  If you have questions about a medical condition or this instruction, always ask your healthcare professional. Shruti Molina disclaims any warranty or liability for your use of this information.

## 2021-11-28 DIAGNOSIS — J30.2 SEASONAL ALLERGIC RHINITIS, UNSPECIFIED TRIGGER: ICD-10-CM

## 2021-11-29 RX ORDER — FLUTICASONE PROPIONATE 50 MCG
SPRAY, SUSPENSION (ML) NASAL
Qty: 16 G | Refills: 0 | Status: SHIPPED | OUTPATIENT
Start: 2021-11-29

## 2022-01-12 ENCOUNTER — NURSE ONLY (OUTPATIENT)
Dept: FAMILY MEDICINE CLINIC | Age: 57
End: 2022-01-12
Payer: COMMERCIAL

## 2022-01-12 DIAGNOSIS — Z23 NEED FOR SHINGLES VACCINE: Primary | ICD-10-CM

## 2022-01-12 PROCEDURE — 90471 IMMUNIZATION ADMIN: CPT | Performed by: FAMILY MEDICINE

## 2022-01-12 PROCEDURE — 90750 HZV VACC RECOMBINANT IM: CPT | Performed by: FAMILY MEDICINE

## 2022-01-12 NOTE — PROGRESS NOTES
Immunizations Administered     Name Date Dose Route    Zoster Recombinant (Shingrix) 1/12/2022 0.5 mL Intramuscular    Site: Deltoid- Left    Lot: [id number]    NDC: 64826-502-33

## 2022-03-12 DIAGNOSIS — J30.2 SEASONAL ALLERGIC RHINITIS, UNSPECIFIED TRIGGER: ICD-10-CM

## 2022-03-14 RX ORDER — LEVOCETIRIZINE DIHYDROCHLORIDE 5 MG/1
TABLET, FILM COATED ORAL
Qty: 90 TABLET | Refills: 3 | Status: SHIPPED | OUTPATIENT
Start: 2022-03-14

## 2022-03-14 NOTE — TELEPHONE ENCOUNTER
LAST VISIT 11/03/2021 WITH HOMER TENA CNP FOR PHYSICAL, NEXT VISIT 11/09/2022 WITH DR MYERS.   [address] [place] [address]

## 2022-11-09 ENCOUNTER — OFFICE VISIT (OUTPATIENT)
Dept: FAMILY MEDICINE CLINIC | Age: 57
End: 2022-11-09
Payer: COMMERCIAL

## 2022-11-09 VITALS
OXYGEN SATURATION: 95 % | HEART RATE: 56 BPM | BODY MASS INDEX: 35.07 KG/M2 | DIASTOLIC BLOOD PRESSURE: 74 MMHG | SYSTOLIC BLOOD PRESSURE: 136 MMHG | WEIGHT: 245 LBS | HEIGHT: 70 IN | RESPIRATION RATE: 20 BRPM

## 2022-11-09 DIAGNOSIS — H60.501 ACUTE OTITIS EXTERNA OF RIGHT EAR, UNSPECIFIED TYPE: ICD-10-CM

## 2022-11-09 DIAGNOSIS — Z11.59 ENCOUNTER FOR HEPATITIS C SCREENING TEST FOR LOW RISK PATIENT: ICD-10-CM

## 2022-11-09 DIAGNOSIS — H91.93 BILATERAL HEARING LOSS, UNSPECIFIED HEARING LOSS TYPE: ICD-10-CM

## 2022-11-09 DIAGNOSIS — Z11.4 ENCOUNTER FOR SCREENING FOR HIV: ICD-10-CM

## 2022-11-09 DIAGNOSIS — Z23 NEED FOR INFLUENZA VACCINATION: ICD-10-CM

## 2022-11-09 DIAGNOSIS — J30.2 SEASONAL ALLERGIC RHINITIS, UNSPECIFIED TRIGGER: ICD-10-CM

## 2022-11-09 DIAGNOSIS — Z00.00 WELL ADULT EXAM: Primary | ICD-10-CM

## 2022-11-09 DIAGNOSIS — E78.00 HYPERCHOLESTEREMIA: ICD-10-CM

## 2022-11-09 DIAGNOSIS — H60.92 RECURRENT OTITIS EXTERNA OF LEFT EAR: ICD-10-CM

## 2022-11-09 DIAGNOSIS — R73.9 HYPERGLYCEMIA: ICD-10-CM

## 2022-11-09 DIAGNOSIS — N52.9 ERECTILE DYSFUNCTION, UNSPECIFIED ERECTILE DYSFUNCTION TYPE: ICD-10-CM

## 2022-11-09 DIAGNOSIS — K63.5 POLYP OF COLON, UNSPECIFIED PART OF COLON, UNSPECIFIED TYPE: ICD-10-CM

## 2022-11-09 PROCEDURE — 90471 IMMUNIZATION ADMIN: CPT | Performed by: FAMILY MEDICINE

## 2022-11-09 PROCEDURE — 99396 PREV VISIT EST AGE 40-64: CPT | Performed by: FAMILY MEDICINE

## 2022-11-09 PROCEDURE — G8484 FLU IMMUNIZE NO ADMIN: HCPCS | Performed by: FAMILY MEDICINE

## 2022-11-09 PROCEDURE — 90674 CCIIV4 VAC NO PRSV 0.5 ML IM: CPT | Performed by: FAMILY MEDICINE

## 2022-11-09 SDOH — ECONOMIC STABILITY: TRANSPORTATION INSECURITY
IN THE PAST 12 MONTHS, HAS LACK OF TRANSPORTATION KEPT YOU FROM MEETINGS, WORK, OR FROM GETTING THINGS NEEDED FOR DAILY LIVING?: NO

## 2022-11-09 SDOH — ECONOMIC STABILITY: FOOD INSECURITY: WITHIN THE PAST 12 MONTHS, YOU WORRIED THAT YOUR FOOD WOULD RUN OUT BEFORE YOU GOT MONEY TO BUY MORE.: NEVER TRUE

## 2022-11-09 SDOH — ECONOMIC STABILITY: FOOD INSECURITY: WITHIN THE PAST 12 MONTHS, THE FOOD YOU BOUGHT JUST DIDN'T LAST AND YOU DIDN'T HAVE MONEY TO GET MORE.: NEVER TRUE

## 2022-11-09 SDOH — ECONOMIC STABILITY: TRANSPORTATION INSECURITY
IN THE PAST 12 MONTHS, HAS THE LACK OF TRANSPORTATION KEPT YOU FROM MEDICAL APPOINTMENTS OR FROM GETTING MEDICATIONS?: NO

## 2022-11-09 ASSESSMENT — PATIENT HEALTH QUESTIONNAIRE - PHQ9
SUM OF ALL RESPONSES TO PHQ QUESTIONS 1-9: 0
SUM OF ALL RESPONSES TO PHQ9 QUESTIONS 1 & 2: 0
SUM OF ALL RESPONSES TO PHQ QUESTIONS 1-9: 0
SUM OF ALL RESPONSES TO PHQ QUESTIONS 1-9: 0
2. FEELING DOWN, DEPRESSED OR HOPELESS: 0
SUM OF ALL RESPONSES TO PHQ QUESTIONS 1-9: 0
1. LITTLE INTEREST OR PLEASURE IN DOING THINGS: 0

## 2022-11-09 ASSESSMENT — SOCIAL DETERMINANTS OF HEALTH (SDOH): HOW HARD IS IT FOR YOU TO PAY FOR THE VERY BASICS LIKE FOOD, HOUSING, MEDICAL CARE, AND HEATING?: NOT HARD AT ALL

## 2022-11-09 NOTE — PROGRESS NOTES
Immunizations Administered       Name Date Dose Route    Influenza, FLUCELVAX, (age 10 mo+), MDCK, PF, 0.5mL 11/9/2022 0.5 mL Intramuscular    Site: Deltoid- Left    Lot: [id number]    NDC: 29764-874-91

## 2022-11-09 NOTE — PROGRESS NOTES
2022    Pavel Rich (:  1965) is a 56 y.o. male, here for a preventive medicine evaluation. Chief Complaint   Patient presents with    Annual Exam     Physical, issues with hearing     Would like Bbloodwork, including A1C. 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 Reduced carbs - less pizza - eating eggs in lieu of cereal and salads for lunch. Using elliptical in am and bikes outside when weather permits  Hearing seems to be down somewhat roland in left ear  Uses otic gtts prn swimmer ear - gets them frequently and gtts help  Vision stable overall - glasses  Sleep good/ mood good  Allergies good as long as taking xyzal - not using flonase lately - doing okay  - uses flonase for scuba diving  Viagra 1/2 tab prn works well  No other vitamins/ supplements -not needed otc analgesics for some time. No gi issues. Parents w/ dm  Sugar high on last at home test  No lightheaded/ urinary changes/ sob/ cp/palp    BP Readings from Last 3 Encounters:   22 136/74   21 124/82   20 118/66     Pulse Readings from Last 3 Encounters:   22 56   21 91   20 (!) 47     Wt Readings from Last 3 Encounters:   22 245 lb (111.1 kg)   21 265 lb (120.2 kg)   20 261 lb 6.4 oz (118.6 kg)       Patient Active Problem List   Diagnosis    Eczema    Allergic rhinitis    Knee pain, right    Ulcer    Scrotal skin lesion    Bradycardia    Tear of meniscus of knee       Review of Systems    Prior to Visit Medications    Medication Sig Taking?  Authorizing Provider   levocetirizine (XYZAL) 5 MG tablet TAKE 1 TABLET BY MOUTH EVERY DAY AT NIGHT Yes TALIA Celis - CNP   sildenafil (VIAGRA) 100 MG tablet Take 1 tablet by mouth daily as needed for Erectile Dysfunction Yes Tracey Cano APRN - CNP   fluticasone (FLONASE) 50 MCG/ACT nasal spray SPRAY 2 SPRAYS INTO EACH NOSTRIL EVERY DAY  Patient not taking: Reported on 2022  Alejandra Tineo APRN - CNP        No Known Allergies    Past Medical History:   Diagnosis Date    Colon polyp 2018    COLONOSCOPY, DR DEDE JEONG, SIGMOID COLON POLYP:  COLONIC MUCOSA WITH LYMPHOCYTIC AGGREGATES. Rectal polyp 2018    COLONOSCOPY, DR DEDE JEONG, SIGMOID COLON POLYP:  COLONIC MUCOSA WITH LYMPHOCYTIC AGGREGATES. RECTUM POLYP:  MUCOSAL PROLAPSE POLYP. Ulcer     stomach       Past Surgical History:   Procedure Laterality Date    COLONOSCOPY  2018    COLONOSCOPY, DR DEDE JEONG, SIGMOID COLON POLYP:  COLONIC MUCOSA WITH LYMPHOCYTIC AGGREGATES. RECTUM POLYP:  MUCOSAL PROLAPSE POLYP. EYE SURGERY      vision correction surgery: lazy eye    KNEE ARTHROSCOPY  2012    right knee    TONSILLECTOMY      as a child        Social History     Socioeconomic History    Marital status:      Spouse name: Not on file    Number of children: Not on file    Years of education: Not on file    Highest education level: Not on file   Occupational History    Not on file   Tobacco Use    Smoking status: Former     Packs/day: 1.00     Years: 10.00     Pack years: 10.00     Types: Cigarettes     Start date:      Quit date:      Years since quittin.8    Smokeless tobacco: Former     Quit date:     Tobacco comments:     Quit 20 years ago; Educate not to restart. Vaping Use    Vaping Use: Never used   Substance and Sexual Activity    Alcohol use:  Yes     Alcohol/week: 0.0 standard drinks     Comment: occasionally     Drug use: No    Sexual activity: Not on file   Other Topics Concern    Not on file   Social History Narrative    Not on file     Social Determinants of Health     Financial Resource Strain: Low Risk     Difficulty of Paying Living Expenses: Not hard at all   Food Insecurity: No Food Insecurity    Worried About [address] in the Last Year: Never true    [facility] in the Last Year: Never true Transportation Needs: No Transportation Needs    Lack of Transportation (Medical): No    Lack of Transportation (Non-Medical): No   Physical Activity: Not on file   Stress: Not on file   Social Connections: Not on file   Intimate Partner Violence: Not on file   Housing Stability: Not on file        Family History   Problem Relation Age of Onset    Diabetes Mother     Asthma Mother     Diabetes Father     Heart Attack Maternal Grandmother     Heart Attack Maternal Grandfather     Cancer Paternal Grandmother         lung    Cancer Paternal Grandfather         pancreatic       ADVANCE DIRECTIVE: N, <no information>    Vitals:    11/09/22 1502   BP: 136/74   Site: Right Upper Arm   Position: Sitting   Cuff Size: Medium Adult   Pulse: 56   Resp: 20   SpO2: 95%   Weight: 245 lb (111.1 kg)   Height: 5' 10\" (1.778 m)     Estimated body mass index is 35.15 kg/m² as calculated from the following:    Height as of this encounter: 5' 10\" (1.778 m). Weight as of this encounter: 245 lb (111.1 kg). Physical Exam  Constitutional:       General: He is not in acute distress. Appearance: He is well-developed. HENT:      Right Ear: Tympanic membrane and ear canal normal.      Left Ear: Tympanic membrane and ear canal normal.   Eyes:      General: No scleral icterus. Conjunctiva/sclera: Conjunctivae normal.   Cardiovascular:      Rate and Rhythm: Normal rate and regular rhythm. Pulses: Normal pulses. Heart sounds: Murmur heard. No gallop. Pulmonary:      Effort: Pulmonary effort is normal. No respiratory distress. Breath sounds: Normal breath sounds. No wheezing, rhonchi or rales. Abdominal:      General: Bowel sounds are normal. There is no distension. Palpations: Abdomen is soft. Tenderness: There is no abdominal tenderness. Musculoskeletal:         General: No swelling. Skin:     Findings: No erythema or rash. Neurological:      Mental Status: He is alert.        No flowsheet data found. No results found for: CHOL, CHOLFAST, TRIG, TRIGLYCFAST, HDL, LDLCHOLESTEROL, LDLCALC, GLUF, GLUCOSE, LABA1C    The ASCVD Risk score (Chandu DK, et al., 2019) failed to calculate for the following reasons:    Cannot find a previous HDL lab    Cannot find a previous total cholesterol lab    Immunization History   Administered Date(s) Administered    COVID-19, PFIZER PURPLE top, DILUTE for use, (age 15 y+), 30mcg/0.3mL 03/30/2021, 04/20/2021, 12/04/2021    Influenza Vaccine, unspecified formulation 11/21/2016    Influenza Virus Vaccine 11/01/2013, 10/03/2014, 11/21/2016    Influenza Whole 11/01/2013, 10/03/2014    Influenza, FLUARIX, FLULAVAL, FLUZONE (age 10 mo+) AND AFLURIA, (age 1 y+), PF, 0.5mL 11/04/2020    Influenza, FLUCELVAX, (age 10 mo+), MDCK, PF, 0.5mL 11/03/2021    Tdap (Boostrix, Adacel) 04/11/2014    Zoster Recombinant (Shingrix) 11/03/2021, 01/12/2022       Health Maintenance   Topic Date Due    HIV screen  Never done    Hepatitis C screen  Never done    Diabetes screen  Never done    Lipids  Never done    COVID-19 Vaccine (3 - Booster for Dykes Luis series) 06/15/2021    Flu vaccine (1) 08/01/2022    Depression Screen  11/03/2022    DTaP/Tdap/Td vaccine (2 - Td or Tdap) 04/11/2024    Colorectal Cancer Screen  05/01/2028    Shingles vaccine  Completed    Hepatitis A vaccine  Aged Out    Hib vaccine  Aged Out    Meningococcal (ACWY) vaccine  Aged Out    Pneumococcal 0-64 years Vaccine  Aged Out       Assessment & Plan       Diagnosis Orders   1. Well adult exam        2. Seasonal allergic rhinitis, unspecified trigger        3. Erectile dysfunction, unspecified erectile dysfunction type        4. Polyp of colon, unspecified part of colon, unspecified type          Colonoscopy 5/18 for f/u polyp - dr. Quintella Meckel  Flu shot today - covid booster d/w pt - o/w utd on vaccines  Psa 6/18 0.39  Fasting lipid,cmp, a1c, psa, hep c, ?  Hiv in near future  Zyrtec/ flonase for rhintiis  Viagra prn  F/u pending labs o/w yearly  Diet/ activity d/w pt -more veggies than carbs  Hx of mild TR/ MR - hold on further echo imaging -sxs d/ w pt - small murmur present but ?  Stable for some tiem      --Danay Lo MD

## 2022-12-08 ENCOUNTER — NURSE ONLY (OUTPATIENT)
Dept: FAMILY MEDICINE CLINIC | Age: 57
End: 2022-12-08
Payer: COMMERCIAL

## 2022-12-08 DIAGNOSIS — Z11.4 ENCOUNTER FOR SCREENING FOR HIV: ICD-10-CM

## 2022-12-08 DIAGNOSIS — E78.00 HYPERCHOLESTEREMIA: ICD-10-CM

## 2022-12-08 DIAGNOSIS — R73.9 HYPERGLYCEMIA: ICD-10-CM

## 2022-12-08 DIAGNOSIS — Z11.59 ENCOUNTER FOR HEPATITIS C SCREENING TEST FOR LOW RISK PATIENT: ICD-10-CM

## 2022-12-08 LAB
A/G RATIO: 1.7 (ref 1.1–2.2)
ALBUMIN SERPL-MCNC: 4.5 G/DL (ref 3.4–5)
ALP BLD-CCNC: 55 U/L (ref 40–129)
ALT SERPL-CCNC: 30 U/L (ref 10–40)
ANION GAP SERPL CALCULATED.3IONS-SCNC: 13 MMOL/L (ref 3–16)
AST SERPL-CCNC: 22 U/L (ref 15–37)
BILIRUB SERPL-MCNC: 0.3 MG/DL (ref 0–1)
BUN BLDV-MCNC: 16 MG/DL (ref 7–20)
CALCIUM SERPL-MCNC: 9.3 MG/DL (ref 8.3–10.6)
CHLORIDE BLD-SCNC: 105 MMOL/L (ref 99–110)
CHOLESTEROL, TOTAL: 218 MG/DL (ref 0–199)
CO2: 23 MMOL/L (ref 21–32)
CREAT SERPL-MCNC: 0.7 MG/DL (ref 0.9–1.3)
GFR SERPL CREATININE-BSD FRML MDRD: >60 ML/MIN/{1.73_M2}
GLUCOSE BLD-MCNC: 95 MG/DL (ref 70–99)
HDLC SERPL-MCNC: 55 MG/DL (ref 40–60)
HEPATITIS C ANTIBODY INTERPRETATION: NORMAL
LDL CHOLESTEROL CALCULATED: 116 MG/DL
POTASSIUM SERPL-SCNC: 4.5 MMOL/L (ref 3.5–5.1)
SODIUM BLD-SCNC: 141 MMOL/L (ref 136–145)
TOTAL PROTEIN: 7.2 G/DL (ref 6.4–8.2)
TRIGL SERPL-MCNC: 236 MG/DL (ref 0–150)
VLDLC SERPL CALC-MCNC: 47 MG/DL

## 2022-12-08 PROCEDURE — 36415 COLL VENOUS BLD VENIPUNCTURE: CPT | Performed by: FAMILY MEDICINE

## 2022-12-09 LAB
ESTIMATED AVERAGE GLUCOSE: 108.3 MG/DL
HBA1C MFR BLD: 5.4 %
HIV AG/AB: NORMAL
HIV ANTIGEN: NORMAL
HIV-1 ANTIBODY: NORMAL
HIV-2 AB: NORMAL

## 2023-03-03 DIAGNOSIS — J30.2 SEASONAL ALLERGIC RHINITIS, UNSPECIFIED TRIGGER: ICD-10-CM

## 2023-03-03 RX ORDER — LEVOCETIRIZINE DIHYDROCHLORIDE 5 MG/1
TABLET, FILM COATED ORAL
Qty: 90 TABLET | Refills: 0 | Status: SHIPPED | OUTPATIENT
Start: 2023-03-03

## 2023-06-01 DIAGNOSIS — J30.2 SEASONAL ALLERGIC RHINITIS, UNSPECIFIED TRIGGER: ICD-10-CM

## 2023-06-01 RX ORDER — LEVOCETIRIZINE DIHYDROCHLORIDE 5 MG/1
TABLET, FILM COATED ORAL
Qty: 90 TABLET | Refills: 0 | Status: SHIPPED | OUTPATIENT
Start: 2023-06-01

## 2023-06-02 ENCOUNTER — E-VISIT (OUTPATIENT)
Dept: FAMILY MEDICINE CLINIC | Age: 58
End: 2023-06-02
Payer: COMMERCIAL

## 2023-06-02 DIAGNOSIS — H92.02 ACUTE PAIN OF LEFT EAR: Primary | ICD-10-CM

## 2023-06-02 PROCEDURE — 99421 OL DIG E/M SVC 5-10 MIN: CPT | Performed by: FAMILY MEDICINE

## 2023-06-02 RX ORDER — AMOXICILLIN AND CLAVULANATE POTASSIUM 875; 125 MG/1; MG/1
1 TABLET, FILM COATED ORAL 2 TIMES DAILY
Qty: 14 TABLET | Refills: 0 | Status: SHIPPED | OUTPATIENT
Start: 2023-06-02 | End: 2023-06-09

## 2023-06-02 RX ORDER — TRAMADOL HYDROCHLORIDE 50 MG/1
50-100 TABLET ORAL NIGHTLY PRN
Qty: 6 TABLET | Refills: 0 | Status: SHIPPED | OUTPATIENT
Start: 2023-06-02 | End: 2023-06-05

## 2023-06-02 RX ORDER — METHYLPREDNISOLONE 4 MG/1
TABLET ORAL
Qty: 1 KIT | Refills: 0 | Status: SHIPPED | OUTPATIENT
Start: 2023-06-02 | End: 2023-06-08

## 2023-06-02 ASSESSMENT — LIFESTYLE VARIABLES
SMOKING_STATUS: NO, I'M A FORMER SMOKER
SMOKING_YEARS: 10
PACKS_PER_DAY: 1

## 2023-06-09 ENCOUNTER — OFFICE VISIT (OUTPATIENT)
Dept: FAMILY MEDICINE CLINIC | Age: 58
End: 2023-06-09
Payer: COMMERCIAL

## 2023-06-09 VITALS
DIASTOLIC BLOOD PRESSURE: 78 MMHG | OXYGEN SATURATION: 98 % | SYSTOLIC BLOOD PRESSURE: 124 MMHG | HEART RATE: 43 BPM | WEIGHT: 245 LBS | BODY MASS INDEX: 35.07 KG/M2 | HEIGHT: 70 IN

## 2023-06-09 DIAGNOSIS — H60.332 ACUTE SWIMMER'S EAR OF LEFT SIDE: ICD-10-CM

## 2023-06-09 PROCEDURE — G8417 CALC BMI ABV UP PARAM F/U: HCPCS | Performed by: NURSE PRACTITIONER

## 2023-06-09 PROCEDURE — 3017F COLORECTAL CA SCREEN DOC REV: CPT | Performed by: NURSE PRACTITIONER

## 2023-06-09 PROCEDURE — G8427 DOCREV CUR MEDS BY ELIG CLIN: HCPCS | Performed by: NURSE PRACTITIONER

## 2023-06-09 PROCEDURE — 99213 OFFICE O/P EST LOW 20 MIN: CPT | Performed by: NURSE PRACTITIONER

## 2023-06-09 PROCEDURE — 4130F TOPICAL PREP RX AOE: CPT | Performed by: NURSE PRACTITIONER

## 2023-06-09 PROCEDURE — 1036F TOBACCO NON-USER: CPT | Performed by: NURSE PRACTITIONER

## 2023-06-09 RX ORDER — FLUTICASONE PROPIONATE 50 MCG
2 SPRAY, SUSPENSION (ML) NASAL DAILY
Qty: 16 G | Refills: 0 | Status: SHIPPED | OUTPATIENT
Start: 2023-06-09

## 2023-06-09 SDOH — ECONOMIC STABILITY: FOOD INSECURITY: WITHIN THE PAST 12 MONTHS, YOU WORRIED THAT YOUR FOOD WOULD RUN OUT BEFORE YOU GOT MONEY TO BUY MORE.: NEVER TRUE

## 2023-06-09 SDOH — ECONOMIC STABILITY: INCOME INSECURITY: HOW HARD IS IT FOR YOU TO PAY FOR THE VERY BASICS LIKE FOOD, HOUSING, MEDICAL CARE, AND HEATING?: NOT HARD AT ALL

## 2023-06-09 SDOH — ECONOMIC STABILITY: FOOD INSECURITY: WITHIN THE PAST 12 MONTHS, THE FOOD YOU BOUGHT JUST DIDN'T LAST AND YOU DIDN'T HAVE MONEY TO GET MORE.: NEVER TRUE

## 2023-06-09 SDOH — ECONOMIC STABILITY: HOUSING INSECURITY
IN THE LAST 12 MONTHS, WAS THERE A TIME WHEN YOU DID NOT HAVE A STEADY PLACE TO SLEEP OR SLEPT IN A SHELTER (INCLUDING NOW)?: NO

## 2023-06-09 ASSESSMENT — PATIENT HEALTH QUESTIONNAIRE - PHQ9
SUM OF ALL RESPONSES TO PHQ QUESTIONS 1-9: 0
1. LITTLE INTEREST OR PLEASURE IN DOING THINGS: 0
SUM OF ALL RESPONSES TO PHQ QUESTIONS 1-9: 0
SUM OF ALL RESPONSES TO PHQ QUESTIONS 1-9: 0
2. FEELING DOWN, DEPRESSED OR HOPELESS: 0
SUM OF ALL RESPONSES TO PHQ9 QUESTIONS 1 & 2: 0
SUM OF ALL RESPONSES TO PHQ QUESTIONS 1-9: 0

## 2023-06-09 ASSESSMENT — ENCOUNTER SYMPTOMS
WHEEZING: 0
SHORTNESS OF BREATH: 0
RHINORRHEA: 0
SORE THROAT: 0
SINUS PAIN: 0
COUGH: 0
SINUS PRESSURE: 0

## 2023-06-09 NOTE — PROGRESS NOTES
numbness and headaches. Psychiatric/Behavioral:  Negative for decreased concentration, dysphoric mood, self-injury, sleep disturbance and suicidal ideas. The patient is not nervous/anxious. Physical Exam  Constitutional:       General: He is not in acute distress. Appearance: Normal appearance. He is obese. HENT:      Right Ear: Tympanic membrane, ear canal and external ear normal. There is no impacted cerumen. Left Ear: Tympanic membrane and external ear normal. There is no impacted cerumen. Ears:      Comments: Erythematous left ear canal with purulent discharge. Tympanic membrane is normal.     Nose: No congestion or rhinorrhea. Mouth/Throat:      Mouth: Mucous membranes are moist.      Pharynx: Oropharynx is clear. No oropharyngeal exudate or posterior oropharyngeal erythema. Pulmonary:      Effort: Pulmonary effort is normal.   Neurological:      Mental Status: He is alert and oriented to person, place, and time. Psychiatric:         Mood and Affect: Mood normal.         Behavior: Behavior normal.         Thought Content: Thought content normal.         Judgment: Judgment normal.             This dictation was generated by voice recognition computer software. Although all attempts are made to edit the dictation for accuracy, there may be errors in the transcription that are not intended. An electronic signature was used to authenticate this note.     --Angie Carmichael, TALIA - CNP

## 2023-07-01 DIAGNOSIS — H60.332 ACUTE SWIMMER'S EAR OF LEFT SIDE: ICD-10-CM

## 2023-07-03 RX ORDER — FLUTICASONE PROPIONATE 50 MCG
SPRAY, SUSPENSION (ML) NASAL
Qty: 3 EACH | Refills: 3 | Status: SHIPPED | OUTPATIENT
Start: 2023-07-03

## 2023-08-27 DIAGNOSIS — J30.2 SEASONAL ALLERGIC RHINITIS, UNSPECIFIED TRIGGER: ICD-10-CM

## 2023-08-28 RX ORDER — LEVOCETIRIZINE DIHYDROCHLORIDE 5 MG/1
TABLET, FILM COATED ORAL
Qty: 90 TABLET | Refills: 0 | Status: SHIPPED | OUTPATIENT
Start: 2023-08-28

## 2023-12-01 DIAGNOSIS — J30.2 SEASONAL ALLERGIC RHINITIS, UNSPECIFIED TRIGGER: ICD-10-CM

## 2023-12-01 RX ORDER — LEVOCETIRIZINE DIHYDROCHLORIDE 5 MG/1
TABLET, FILM COATED ORAL
Qty: 90 TABLET | Refills: 0 | Status: SHIPPED | OUTPATIENT
Start: 2023-12-01

## 2023-12-28 NOTE — TELEPHONE ENCOUNTER
----- Message from Laura Love sent at 11/3/2021  1:04 PM EDT -----  Subject: Message to Provider    QUESTIONS  Information for Provider? pt will be late. stuck in traffice 1/4 mile from   office  ---------------------------------------------------------------------------  --------------  [address]  What is the best way for the office to contact you? OK to leave message on   voicemail  Preferred Call Back Phone Number? [phone]  ---------------------------------------------------------------------------  --------------  SCRIPT ANSWERS  Relationship to Patient?  Self
PT IS CURRENTLY IN OFFICE BEING SEEN.  SC
09-Oct-2023

## 2024-02-20 ENCOUNTER — E-VISIT (OUTPATIENT)
Dept: FAMILY MEDICINE CLINIC | Age: 59
End: 2024-02-20
Payer: COMMERCIAL

## 2024-02-20 DIAGNOSIS — H60.332 ACUTE SWIMMER'S EAR OF LEFT SIDE: ICD-10-CM

## 2024-02-20 PROCEDURE — 99422 OL DIG E/M SVC 11-20 MIN: CPT | Performed by: NURSE PRACTITIONER

## 2024-02-21 NOTE — PROGRESS NOTES
11-20 minutes were dedicated to e-visit.  This includes review of questionnaire, review of chart, prescription medication, and messaging the patient.

## 2024-03-27 DIAGNOSIS — J30.2 SEASONAL ALLERGIC RHINITIS, UNSPECIFIED TRIGGER: ICD-10-CM

## 2024-03-27 RX ORDER — LEVOCETIRIZINE DIHYDROCHLORIDE 5 MG/1
TABLET, FILM COATED ORAL
Qty: 90 TABLET | Refills: 0 | Status: SHIPPED | OUTPATIENT
Start: 2024-03-27

## 2024-05-07 ENCOUNTER — TELEPHONE (OUTPATIENT)
Dept: FAMILY MEDICINE CLINIC | Age: 59
End: 2024-05-07

## 2024-05-07 DIAGNOSIS — J30.2 SEASONAL ALLERGIC RHINITIS, UNSPECIFIED TRIGGER: ICD-10-CM

## 2024-05-07 RX ORDER — LEVOCETIRIZINE DIHYDROCHLORIDE 5 MG/1
5 TABLET, FILM COATED ORAL NIGHTLY
Qty: 90 TABLET | Refills: 0 | Status: SHIPPED | OUTPATIENT
Start: 2024-05-07

## 2024-06-05 ENCOUNTER — OFFICE VISIT (OUTPATIENT)
Dept: FAMILY MEDICINE CLINIC | Age: 59
End: 2024-06-05
Payer: COMMERCIAL

## 2024-06-05 VITALS
HEIGHT: 70 IN | DIASTOLIC BLOOD PRESSURE: 72 MMHG | SYSTOLIC BLOOD PRESSURE: 112 MMHG | HEART RATE: 52 BPM | WEIGHT: 247 LBS | RESPIRATION RATE: 18 BRPM | BODY MASS INDEX: 35.36 KG/M2 | OXYGEN SATURATION: 97 %

## 2024-06-05 DIAGNOSIS — J30.2 SEASONAL ALLERGIC RHINITIS, UNSPECIFIED TRIGGER: ICD-10-CM

## 2024-06-05 DIAGNOSIS — L71.9 ROSACEA: ICD-10-CM

## 2024-06-05 DIAGNOSIS — R73.9 HYPERGLYCEMIA: ICD-10-CM

## 2024-06-05 DIAGNOSIS — N52.9 ERECTILE DYSFUNCTION, UNSPECIFIED ERECTILE DYSFUNCTION TYPE: ICD-10-CM

## 2024-06-05 DIAGNOSIS — H91.93 BILATERAL HEARING LOSS, UNSPECIFIED HEARING LOSS TYPE: ICD-10-CM

## 2024-06-05 DIAGNOSIS — K63.5 POLYP OF COLON, UNSPECIFIED PART OF COLON, UNSPECIFIED TYPE: ICD-10-CM

## 2024-06-05 DIAGNOSIS — Z12.5 PROSTATE CANCER SCREENING: ICD-10-CM

## 2024-06-05 DIAGNOSIS — E78.00 HYPERCHOLESTEREMIA: ICD-10-CM

## 2024-06-05 DIAGNOSIS — R06.81 WITNESSED EPISODE OF APNEA: ICD-10-CM

## 2024-06-05 DIAGNOSIS — R74.8 ELEVATED LIVER ENZYMES: ICD-10-CM

## 2024-06-05 DIAGNOSIS — Z00.00 WELL ADULT EXAM: Primary | ICD-10-CM

## 2024-06-05 DIAGNOSIS — Z23 NEED FOR TDAP VACCINATION: ICD-10-CM

## 2024-06-05 DIAGNOSIS — R00.1 BRADYCARDIA: ICD-10-CM

## 2024-06-05 LAB
ALBUMIN SERPL-MCNC: 4.6 G/DL (ref 3.4–5)
ALBUMIN/GLOB SERPL: 1.6 {RATIO} (ref 1.1–2.2)
ALP SERPL-CCNC: 59 U/L (ref 40–129)
ALT SERPL-CCNC: 64 U/L (ref 10–40)
ANION GAP SERPL CALCULATED.3IONS-SCNC: 13 MMOL/L (ref 3–16)
AST SERPL-CCNC: 53 U/L (ref 15–37)
BILIRUB SERPL-MCNC: 0.6 MG/DL (ref 0–1)
BUN SERPL-MCNC: 15 MG/DL (ref 7–20)
CALCIUM SERPL-MCNC: 9.9 MG/DL (ref 8.3–10.6)
CHLORIDE SERPL-SCNC: 104 MMOL/L (ref 99–110)
CHOLEST SERPL-MCNC: 215 MG/DL (ref 0–199)
CO2 SERPL-SCNC: 23 MMOL/L (ref 21–32)
CREAT SERPL-MCNC: 0.8 MG/DL (ref 0.9–1.3)
GFR SERPLBLD CREATININE-BSD FMLA CKD-EPI: >90 ML/MIN/{1.73_M2}
GLUCOSE SERPL-MCNC: 108 MG/DL (ref 70–99)
HDLC SERPL-MCNC: 57 MG/DL (ref 40–60)
LDLC SERPL CALC-MCNC: 139 MG/DL
POTASSIUM SERPL-SCNC: 4.4 MMOL/L (ref 3.5–5.1)
PROT SERPL-MCNC: 7.4 G/DL (ref 6.4–8.2)
PSA SERPL DL<=0.01 NG/ML-MCNC: 0.34 NG/ML (ref 0–4)
SODIUM SERPL-SCNC: 140 MMOL/L (ref 136–145)
TRIGL SERPL-MCNC: 97 MG/DL (ref 0–150)
VLDLC SERPL CALC-MCNC: 19 MG/DL

## 2024-06-05 PROCEDURE — 90471 IMMUNIZATION ADMIN: CPT | Performed by: FAMILY MEDICINE

## 2024-06-05 PROCEDURE — 90715 TDAP VACCINE 7 YRS/> IM: CPT | Performed by: FAMILY MEDICINE

## 2024-06-05 PROCEDURE — 36415 COLL VENOUS BLD VENIPUNCTURE: CPT | Performed by: FAMILY MEDICINE

## 2024-06-05 RX ORDER — SILDENAFIL 100 MG/1
100 TABLET, FILM COATED ORAL DAILY PRN
Qty: 30 TABLET | Refills: 3 | Status: SHIPPED | OUTPATIENT
Start: 2024-06-05

## 2024-06-05 ASSESSMENT — PATIENT HEALTH QUESTIONNAIRE - PHQ9
SUM OF ALL RESPONSES TO PHQ QUESTIONS 1-9: 0
2. FEELING DOWN, DEPRESSED OR HOPELESS: NOT AT ALL
SUM OF ALL RESPONSES TO PHQ QUESTIONS 1-9: 0
1. LITTLE INTEREST OR PLEASURE IN DOING THINGS: NOT AT ALL
SUM OF ALL RESPONSES TO PHQ9 QUESTIONS 1 & 2: 0

## 2024-06-05 NOTE — PROGRESS NOTES
FASTING LABS DRAWN [facility]  
Immunizations Administered       Name Date Dose Route    TDaP, ADACEL (age 10y-64y), BOOSTRIX (age 10y+), IM, 0.5mL 6/5/2024 0.5 mL Intramuscular    Site: Deltoid- Left    Lot: [id number]    NDC: 70169-533-69           
Future  -     Hemoglobin A1C; Future  Hypercholesteremia as above no treatment presently.  Discussed with patient ASCVD risk decision tree  -     Lipid Panel; Future  -     Comprehensive Metabolic Panel; Future  Witnessed episode of apnea refer to sleep medicine for evaluation  -     Ronn Ferro MD, Sleep Medicine, [facility]  Bradycardia stable without symptoms  Rosacea using hydrocortisone over-the-counter as needed discussed with patient long-term side effects of chronic topical steroids.  Consider Derm if worsening as not clear this is all rosacea    Colonoscopy 5/18 - 3y f/u recommended? - will f/u w/ dr. Quinn, GI office  Tdap today - good on shingrix - can get flu/ covid booster in fall    Return in about 1 year (around 6/5/2025) for FOLLOW UP WITH DR. DE SOUZA NEXT VISIT, PHYSICAL.           --Petr De Souza MD

## 2024-06-05 NOTE — PATIENT INSTRUCTIONS

## 2024-06-06 LAB
EST. AVERAGE GLUCOSE BLD GHB EST-MCNC: 111.2 MG/DL
HBA1C MFR BLD: 5.5 %

## 2024-06-24 DIAGNOSIS — J30.2 SEASONAL ALLERGIC RHINITIS, UNSPECIFIED TRIGGER: ICD-10-CM

## 2024-06-24 RX ORDER — LEVOCETIRIZINE DIHYDROCHLORIDE 5 MG/1
5 TABLET, FILM COATED ORAL NIGHTLY
Qty: 90 TABLET | Refills: 0 | Status: SHIPPED | OUTPATIENT
Start: 2024-06-24

## 2024-07-03 ENCOUNTER — PROCEDURE VISIT (OUTPATIENT)
Dept: AUDIOLOGY | Age: 59
End: 2024-07-03
Payer: COMMERCIAL

## 2024-07-03 DIAGNOSIS — H90.3 SENSORINEURAL HEARING LOSS, BILATERAL: Primary | ICD-10-CM

## 2024-07-03 DIAGNOSIS — H93.13 TINNITUS OF BOTH EARS: ICD-10-CM

## 2024-07-03 PROCEDURE — 92557 COMPREHENSIVE HEARING TEST: CPT

## 2024-07-03 PROCEDURE — 92567 TYMPANOMETRY: CPT

## 2024-07-03 NOTE — PROGRESS NOTES
Axel Car   1965, 58 y.o. male   [id number]       Referring Provider: Petr De Souza MD   Referral Type: In an order in Epic    Reason for Visit: Evaluation of suspected change in hearing, tinnitus, or balance.    ADULT AUDIOLOGIC EVALUATION      Axel Car is a 58 y.o. male seen today, 7/3/2024 , for an initial audiologic evaluation.     AUDIOLOGIC AND OTHER PERTINENT MEDICAL HISTORY:      Axel Car reports gradual decrease in hearing and his wife who used to be an audiologist has suggested he get a hearing test. This has been most noticeable within the last year. Also endorses some intermittent non-bothersome tinnitus bilaterally. 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     He denied otalgia, aural fullness, otorrhea, dizziness, imbalance, history of falls, history of head trauma, history of ear surgery, and family history of hearing loss    Date: 7/3/2024     IMPRESSIONS:      Today's results revealed symmetric sensorineural hearing loss with Excellent speech understanding when in quiet, bilaterally. Tympanometry indicates normal middle ear function. Discussed test results and implications with patient. Discussed scheduling a HAE. Discussed use of tinnitus management strategies. Hearing aids recommended at this time. Discussed noise exposure and the importance of appropriate hearing protection when in hazardous noise environments.  Follow medical recommendations of Petr De Souza MD .     ASSESSMENT AND FINDINGS:     Otoscopy unremarkable.    RIGHT EAR:  Hearing Sensitivity: Normal sloping to moderate sensorineural hearing loss   Speech Recognition Threshold: 20 dB HL  Word Recognition: Excellent 100%, based on NU-6 25-word list at 60 dBHL using recorded speech stimuli.    Tympanometry: Normal peak pressure and compliance, Type A tympanogram, consistent with normal middle ear function.       LEFT EAR:  Hearing Sensitivity: Normal sloping to moderate

## 2024-07-03 NOTE — PATIENT INSTRUCTIONS
in the house   - Stay at eye level to help with visual cues   - Make sure you have the person’s attention before speaking   - Use facial expressions and gestures to accentuate your message   - Raise your voice slightly (do not scream)   - Speak slowly and distinctly   - Use short, simple sentences   - Rephrase your words if the person is having a hard time understanding you    - To avoid distortion, don’t speak directly into a person’s ear      Some additional items that may be helpful:   - Remain patient - this is important for both parties   - Write down items that still cannot be heard/understood.  You may write with pen/paper or consider typing/texting on a cell phone or smart device.   - If background noise is unavoidable, try to keep yourself in a good position in the room.  By sitting at a chaney on the side of the restaurant (preferably a corner), it will be easier to communicate than if you were sitting at a table in the middle with background noise surrounding you.  Keep yourself positioned away from music speakers or heavy foot traffic.

## 2024-08-26 ENCOUNTER — OFFICE VISIT (OUTPATIENT)
Dept: PULMONOLOGY | Age: 59
End: 2024-08-26
Payer: COMMERCIAL

## 2024-08-26 VITALS
SYSTOLIC BLOOD PRESSURE: 118 MMHG | BODY MASS INDEX: 35.99 KG/M2 | WEIGHT: 251.4 LBS | HEIGHT: 70 IN | OXYGEN SATURATION: 95 % | DIASTOLIC BLOOD PRESSURE: 78 MMHG | HEART RATE: 52 BPM

## 2024-08-26 DIAGNOSIS — G47.33 OSA (OBSTRUCTIVE SLEEP APNEA): Primary | ICD-10-CM

## 2024-08-26 PROCEDURE — G8427 DOCREV CUR MEDS BY ELIG CLIN: HCPCS | Performed by: INTERNAL MEDICINE

## 2024-08-26 PROCEDURE — G8417 CALC BMI ABV UP PARAM F/U: HCPCS | Performed by: INTERNAL MEDICINE

## 2024-08-26 PROCEDURE — 1036F TOBACCO NON-USER: CPT | Performed by: INTERNAL MEDICINE

## 2024-08-26 PROCEDURE — 99204 OFFICE O/P NEW MOD 45 MIN: CPT | Performed by: INTERNAL MEDICINE

## 2024-08-26 PROCEDURE — 3017F COLORECTAL CA SCREEN DOC REV: CPT | Performed by: INTERNAL MEDICINE

## 2024-08-26 ASSESSMENT — SLEEP AND FATIGUE QUESTIONNAIRES
HOW LIKELY ARE YOU TO NOD OFF OR FALL ASLEEP WHILE WATCHING TV: SLIGHT CHANCE OF DOZING
HOW LIKELY ARE YOU TO NOD OFF OR FALL ASLEEP WHILE SITTING QUIETLY AFTER LUNCH WITHOUT ALCOHOL: WOULD NEVER DOZE
HOW LIKELY ARE YOU TO NOD OFF OR FALL ASLEEP WHILE SITTING AND TALKING TO SOMEONE: WOULD NEVER DOZE
HOW LIKELY ARE YOU TO NOD OFF OR FALL ASLEEP WHILE LYING DOWN TO REST IN THE AFTERNOON WHEN CIRCUMSTANCES PERMIT: SLIGHT CHANCE OF DOZING
HOW LIKELY ARE YOU TO NOD OFF OR FALL ASLEEP WHILE SITTING INACTIVE IN A PUBLIC PLACE: WOULD NEVER DOZE
ESS TOTAL SCORE: 3
HOW LIKELY ARE YOU TO NOD OFF OR FALL ASLEEP WHEN YOU ARE A PASSENGER IN A CAR FOR AN HOUR WITHOUT A BREAK: SLIGHT CHANCE OF DOZING
HOW LIKELY ARE YOU TO NOD OFF OR FALL ASLEEP IN A CAR, WHILE STOPPED FOR A FEW MINUTES IN TRAFFIC: WOULD NEVER DOZE
HOW LIKELY ARE YOU TO NOD OFF OR FALL ASLEEP WHILE SITTING AND READING: WOULD NEVER DOZE

## 2024-08-26 NOTE — PROGRESS NOTES
his stated age. He is alert and oriented x 3 and in no acute distress.   HEENT: No scleral icterus. Atraumatic, normocephalic.  NECK: Supple, without cervical or supraclavicular lymphadenopathy:  CARDIOVASCULAR: S1 S2 RRR. Without murmer  RESPIRATORY & CHEST: Lungs are clear to auscultation and percussion. No wheezing, no crackles. Good air movement  GASTROINTESTINAL & ABDOMEN: Soft, nontender, non-distended.  GENITOURINARY: Deferred.   MUSCULOSKELETAL: There is no clubbing. No cyanosis. No edema of the lower extremities.   SKIN OF BODY: No rash or jaundice.   PSYCHIATRIC EVALUATION: Normal affect. Patient answers questions appropriately.   HEMATOLOGIC/LYMPHATIC/ IMMUNOLOGIC: No palpable lymphadenopathy.  NEUROLOGIC: Alert and oriented x 3.Groslly non-focal. Motor strength is 5+/5 in all muscle groups. The patient has a normal sensorium globally.      LABS:  Lab Results   Component Value Date    CHOL 215 (H) 06/05/2024    TRIG 97 06/05/2024    HDL 57 06/05/2024    ALT 64 (H) 06/05/2024    AST 53 (H) 06/05/2024     06/05/2024    K 4.4 06/05/2024     06/05/2024    CREATININE 0.8 (L) 06/05/2024    BUN 15 06/05/2024    CO2 23 06/05/2024    PSA 0.34 06/05/2024       Lab Results   Component Value Date    GLUCOSE 108 (H) 06/05/2024    CALCIUM 9.9 06/05/2024     06/05/2024    K 4.4 06/05/2024    CO2 23 06/05/2024     06/05/2024    BUN 15 06/05/2024    CREATININE 0.8 (L) 06/05/2024 8/26/2024     1:00 PM   Sleep Medicine   Sitting and reading 0   Watching TV 1   Sitting, inactive in a public place (e.g. a theatre or a meeting) 0   As a passenger in a car for an hour without a break 1   Lying down to rest in the afternoon when circumstances permit 1   Sitting and talking to someone 0   Sitting quietly after a lunch without alcohol 0   In a car, while stopped for a few minutes in traffic 0   [place] Sleepiness Score 3   Neck (Inches) 15.75          ASSESSMENT AND PLAN:     1. ADRIAN (obstructive

## 2024-08-27 ENCOUNTER — TELEPHONE (OUTPATIENT)
Dept: FAMILY MEDICINE CLINIC | Age: 59
End: 2024-08-27

## 2024-08-27 DIAGNOSIS — J30.2 SEASONAL ALLERGIC RHINITIS, UNSPECIFIED TRIGGER: ICD-10-CM

## 2024-08-27 RX ORDER — LEVOCETIRIZINE DIHYDROCHLORIDE 5 MG/1
5 TABLET, FILM COATED ORAL NIGHTLY
Qty: 90 TABLET | Refills: 1 | Status: SHIPPED | OUTPATIENT
Start: 2024-08-27

## 2024-08-28 ENCOUNTER — TELEPHONE (OUTPATIENT)
Dept: SLEEP CENTER | Age: 59
End: 2024-08-28

## 2024-08-28 ENCOUNTER — PATIENT MESSAGE (OUTPATIENT)
Dept: FAMILY MEDICINE CLINIC | Age: 59
End: 2024-08-28

## 2024-08-28 NOTE — TELEPHONE ENCOUNTER
Called to schedule a PSG per Joni marin for the pt to return my call     [facility] insurance

## 2024-08-29 ENCOUNTER — LAB (OUTPATIENT)
Dept: FAMILY MEDICINE CLINIC | Age: 59
End: 2024-08-29
Payer: COMMERCIAL

## 2024-08-29 DIAGNOSIS — R74.8 ELEVATED LIVER ENZYMES: Primary | ICD-10-CM

## 2024-08-29 DIAGNOSIS — E78.00 HYPERCHOLESTEREMIA: ICD-10-CM

## 2024-08-29 LAB
ALBUMIN SERPL-MCNC: 4.4 G/DL (ref 3.4–5)
ALBUMIN/GLOB SERPL: 1.8 {RATIO} (ref 1.1–2.2)
ALP SERPL-CCNC: 54 U/L (ref 40–129)
ALT SERPL-CCNC: 27 U/L (ref 10–40)
ANION GAP SERPL CALCULATED.3IONS-SCNC: 10 MMOL/L (ref 3–16)
AST SERPL-CCNC: 23 U/L (ref 15–37)
BILIRUB SERPL-MCNC: 0.4 MG/DL (ref 0–1)
BUN SERPL-MCNC: 13 MG/DL (ref 7–20)
CALCIUM SERPL-MCNC: 8.9 MG/DL (ref 8.3–10.6)
CHLORIDE SERPL-SCNC: 105 MMOL/L (ref 99–110)
CO2 SERPL-SCNC: 24 MMOL/L (ref 21–32)
CREAT SERPL-MCNC: 0.8 MG/DL (ref 0.9–1.3)
GFR SERPLBLD CREATININE-BSD FMLA CKD-EPI: >90 ML/MIN/{1.73_M2}
GLUCOSE SERPL-MCNC: 136 MG/DL (ref 70–99)
POTASSIUM SERPL-SCNC: 4.5 MMOL/L (ref 3.5–5.1)
PROT SERPL-MCNC: 6.8 G/DL (ref 6.4–8.2)
SODIUM SERPL-SCNC: 139 MMOL/L (ref 136–145)

## 2024-08-29 PROCEDURE — 36415 COLL VENOUS BLD VENIPUNCTURE: CPT

## 2024-10-16 ENCOUNTER — HOSPITAL ENCOUNTER (OUTPATIENT)
Dept: SLEEP CENTER | Age: 59
Discharge: HOME | End: 2024-10-16
Payer: COMMERCIAL

## 2024-10-16 DIAGNOSIS — G47.33 OSA (OBSTRUCTIVE SLEEP APNEA): ICD-10-CM

## 2024-10-16 PROCEDURE — 95810 POLYSOM 6/> YRS 4/> PARAM: CPT

## 2024-10-17 PROBLEM — G47.33 OSA (OBSTRUCTIVE SLEEP APNEA): Status: ACTIVE | Noted: 2024-10-17

## 2024-10-18 ENCOUNTER — TELEPHONE (OUTPATIENT)
Dept: PULMONOLOGY | Age: 59
End: 2024-10-18

## 2024-10-21 ENCOUNTER — E-VISIT (OUTPATIENT)
Dept: FAMILY MEDICINE CLINIC | Age: 59
End: 2024-10-21
Payer: COMMERCIAL

## 2024-10-21 DIAGNOSIS — L98.9 LESION OF FACE: Primary | ICD-10-CM

## 2024-10-21 PROCEDURE — 99421 OL DIG E/M SVC 5-10 MIN: CPT | Performed by: NURSE PRACTITIONER

## 2024-10-21 NOTE — TELEPHONE ENCOUNTER
Spoke to patient and gave results. Sleep study showed that CPAP therapy wasn't warranted at this time. He stated he would rather cancel his follow up at this time unless he feels necesssary to return if symptoms do not improve.

## 2024-10-21 NOTE — PROGRESS NOTES
5-10 minutes were dedicated to e-visit.  This includes review questionnaire, review of chart, prescription medication, messaging the patient.

## 2025-01-22 ENCOUNTER — OFFICE VISIT (OUTPATIENT)
Dept: FAMILY MEDICINE CLINIC | Age: 60
End: 2025-01-22
Payer: COMMERCIAL

## 2025-01-22 VITALS
OXYGEN SATURATION: 98 % | BODY MASS INDEX: 37.71 KG/M2 | SYSTOLIC BLOOD PRESSURE: 142 MMHG | HEART RATE: 45 BPM | WEIGHT: 262.8 LBS | DIASTOLIC BLOOD PRESSURE: 84 MMHG

## 2025-01-22 DIAGNOSIS — R05.9 COUGH, UNSPECIFIED TYPE: ICD-10-CM

## 2025-01-22 DIAGNOSIS — J40 BRONCHITIS: Primary | ICD-10-CM

## 2025-01-22 DIAGNOSIS — R06.2 WHEEZING ON AUSCULTATION: ICD-10-CM

## 2025-01-22 LAB
INFLUENZA A ANTIGEN, POC: NEGATIVE
INFLUENZA B ANTIGEN, POC: NEGATIVE
LOT EXPIRE DATE: NORMAL
LOT KIT NUMBER: NORMAL
SARS-COV-2, POC: NORMAL
VALID INTERNAL CONTROL: PRESENT
VENDOR AND KIT NAME POC: NORMAL

## 2025-01-22 PROCEDURE — 99215 OFFICE O/P EST HI 40 MIN: CPT | Performed by: REGISTERED NURSE

## 2025-01-22 PROCEDURE — G8417 CALC BMI ABV UP PARAM F/U: HCPCS | Performed by: REGISTERED NURSE

## 2025-01-22 PROCEDURE — 1036F TOBACCO NON-USER: CPT | Performed by: REGISTERED NURSE

## 2025-01-22 PROCEDURE — 87428 SARSCOV & INF VIR A&B AG IA: CPT | Performed by: REGISTERED NURSE

## 2025-01-22 PROCEDURE — 3017F COLORECTAL CA SCREEN DOC REV: CPT | Performed by: REGISTERED NURSE

## 2025-01-22 PROCEDURE — G8427 DOCREV CUR MEDS BY ELIG CLIN: HCPCS | Performed by: REGISTERED NURSE

## 2025-01-22 RX ORDER — PREDNISONE 10 MG/1
40 TABLET ORAL DAILY
Qty: 20 TABLET | Refills: 0 | Status: SHIPPED | OUTPATIENT
Start: 2025-01-22 | End: 2025-01-27

## 2025-01-22 RX ORDER — BENZONATATE 100 MG/1
100 CAPSULE ORAL 3 TIMES DAILY PRN
Qty: 21 CAPSULE | Refills: 0 | Status: SHIPPED | OUTPATIENT
Start: 2025-01-22 | End: 2025-01-29

## 2025-01-22 RX ORDER — ALBUTEROL SULFATE 90 UG/1
2 INHALANT RESPIRATORY (INHALATION) EVERY 4 HOURS PRN
Qty: 18 G | Refills: 0 | Status: SHIPPED | OUTPATIENT
Start: 2025-01-22

## 2025-01-22 RX ORDER — IPRATROPIUM BROMIDE AND ALBUTEROL SULFATE 2.5; .5 MG/3ML; MG/3ML
1 SOLUTION RESPIRATORY (INHALATION) ONCE
Status: COMPLETED | OUTPATIENT
Start: 2025-01-22 | End: 2025-01-22

## 2025-01-22 RX ORDER — AZITHROMYCIN 250 MG/1
TABLET, FILM COATED ORAL
Qty: 6 TABLET | Refills: 0 | Status: SHIPPED | OUTPATIENT
Start: 2025-01-22 | End: 2025-02-01

## 2025-01-22 RX ADMIN — IPRATROPIUM BROMIDE AND ALBUTEROL SULFATE 1 DOSE: 2.5; .5 SOLUTION RESPIRATORY (INHALATION) at 08:45

## 2025-01-22 SDOH — ECONOMIC STABILITY: FOOD INSECURITY: WITHIN THE PAST 12 MONTHS, YOU WORRIED THAT YOUR FOOD WOULD RUN OUT BEFORE YOU GOT MONEY TO BUY MORE.: NEVER TRUE

## 2025-01-22 SDOH — ECONOMIC STABILITY: FOOD INSECURITY: WITHIN THE PAST 12 MONTHS, THE FOOD YOU BOUGHT JUST DIDN'T LAST AND YOU DIDN'T HAVE MONEY TO GET MORE.: NEVER TRUE

## 2025-01-22 ASSESSMENT — ENCOUNTER SYMPTOMS
NAUSEA: 0
SINUS PAIN: 0
SORE THROAT: 1
RHINORRHEA: 1
SINUS PRESSURE: 0
VOMITING: 0
COUGH: 1
SHORTNESS OF BREATH: 1
CHEST TIGHTNESS: 0
WHEEZING: 1
CONSTIPATION: 0
DIARRHEA: 0

## 2025-01-22 ASSESSMENT — PATIENT HEALTH QUESTIONNAIRE - PHQ9
SUM OF ALL RESPONSES TO PHQ QUESTIONS 1-9: 0
SUM OF ALL RESPONSES TO PHQ QUESTIONS 1-9: 0
1. LITTLE INTEREST OR PLEASURE IN DOING THINGS: NOT AT ALL
SUM OF ALL RESPONSES TO PHQ QUESTIONS 1-9: 0
SUM OF ALL RESPONSES TO PHQ QUESTIONS 1-9: 0
SUM OF ALL RESPONSES TO PHQ9 QUESTIONS 1 & 2: 0

## 2025-01-22 NOTE — PROGRESS NOTES
Axel Car (:  1965) is a 59 y.o. male,Established patient, here for evaluation of the following chief complaint(s):  Cough (COUGH X since Friday /CONGESTION X Since Friday )         Assessment & Plan  Bronchitis   New, not at goal (unstable), COVID-19, Flu A/B all negative in office today, breathing treatment administered in office today for wheezing that I Auscultated throughout pt's lung bases. This resulted in resolution of pt's wheezing. However, will go ahead and prescribe an Albuterol inhaler that pt can take q4PRN for wheezing/sOB, proper administration instructions were discussed with pt.   - Z-Claude and burst of steroids prescribed, r/b/se of medications explained to pt who verbalized understanding.   - otherwise, symptoms management - Tylenol/Ibuprofen for fevers, minor aches/pains, Sudafed, Afrin NS for 72 hours (to avoid rebound medicamentosus), Mucinex to help thin secretions, Robitussin for dry/nonproductive cough, staying well-hydrated by drinking minimum 64 ounces of water daily, consider Gatorade or other electrolyte supplement (prefer sugar-free option), a couple of tablespoons of honey throughout the day to help soothe throat, hot liquids to help soothe throat. Pt was encouraged to get plenty of rest.  - pt was educated regarding social distancing, wearing a mask when out in public or around family members/in common areas,  proper hand hygiene. Pt was made aware that she is considered contagious until she is fever/chill-free 24 hours without the use of antipyretic.   - pt to call the office by Friday should his symptoms not improve, will then consider ordering a CXR.   Orders:    azithromycin (ZITHROMAX) 250 MG tablet; 500mg on day 1 followed by 250mg on days 2 - 5    predniSONE (DELTASONE) 10 MG tablet; Take 4 tablets by mouth daily for 5 days    albuterol sulfate HFA (VENTOLIN HFA) 108 (90 Base) MCG/ACT inhaler; Inhale 2 puffs into the lungs every 4 hours as needed for Wheezing

## 2025-03-31 ENCOUNTER — E-VISIT (OUTPATIENT)
Dept: FAMILY MEDICINE CLINIC | Age: 60
End: 2025-03-31
Payer: COMMERCIAL

## 2025-03-31 DIAGNOSIS — H60.332 ACUTE SWIMMER'S EAR OF LEFT SIDE: ICD-10-CM

## 2025-03-31 PROCEDURE — 99422 OL DIG E/M SVC 11-20 MIN: CPT | Performed by: REGISTERED NURSE

## 2025-03-31 RX ORDER — NEOMYCIN SULFATE, POLYMYXIN B SULFATE, HYDROCORTISONE 3.5; 10000; 1 MG/ML; [USP'U]/ML; MG/ML
4 SOLUTION/ DROPS AURICULAR (OTIC) 3 TIMES DAILY
Qty: 10 ML | Refills: 0 | Status: SHIPPED | OUTPATIENT
Start: 2025-03-31 | End: 2025-04-10

## 2025-03-31 NOTE — PROGRESS NOTES
Axel Car (1965) initiated an asynchronous digital communication through [employer].    HPI: per patient questionnaire     Exam: not applicable    Diagnoses and all orders for this visit:  Diagnoses and all orders for this visit:    Acute swimmer's ear of left side  -     neomycin-polymyxin-hydrocortisone (CORTISPORIN) 3.5-32079-4 otic solution; Place 4 drops into the left ear 3 times daily for 10 days Instill into right ear          11 minutes were spent on the digital evaluation and management of this patient.    Ness Pyle, TALIA - CNP

## 2025-05-07 ENCOUNTER — E-VISIT (OUTPATIENT)
Dept: FAMILY MEDICINE CLINIC | Age: 60
End: 2025-05-07
Payer: COMMERCIAL

## 2025-05-07 DIAGNOSIS — Z91.89 AT RISK FOR INFECTIOUS DISEASE DUE TO RECENT FOREIGN TRAVEL: Primary | ICD-10-CM

## 2025-05-07 PROCEDURE — 99214 OFFICE O/P EST MOD 30 MIN: CPT

## 2025-05-07 RX ORDER — CIPROFLOXACIN 500 MG/1
500 TABLET, FILM COATED ORAL 2 TIMES DAILY
Qty: 14 TABLET | Refills: 0 | Status: SHIPPED | OUTPATIENT
Start: 2025-05-07 | End: 2025-05-14

## 2025-05-07 RX ORDER — AZITHROMYCIN 250 MG/1
TABLET, FILM COATED ORAL
Qty: 6 TABLET | Refills: 0 | Status: SHIPPED | OUTPATIENT
Start: 2025-05-07 | End: 2025-05-17

## 2025-05-07 NOTE — PROGRESS NOTES
11-20 minutes were dedicated to e-visit.  This includes review questionnaire, review of chart, prescription medication, messaging the patient.      Jonathan Moy MD  5/7/2025 2:50 PM

## 2025-06-20 ENCOUNTER — OFFICE VISIT (OUTPATIENT)
Dept: FAMILY MEDICINE CLINIC | Age: 60
End: 2025-06-20
Payer: COMMERCIAL

## 2025-06-20 VITALS
HEART RATE: 50 BPM | DIASTOLIC BLOOD PRESSURE: 62 MMHG | BODY MASS INDEX: 35.36 KG/M2 | SYSTOLIC BLOOD PRESSURE: 120 MMHG | WEIGHT: 247 LBS | OXYGEN SATURATION: 97 % | HEIGHT: 70 IN

## 2025-06-20 DIAGNOSIS — Z23 ENCOUNTER FOR VACCINATION: ICD-10-CM

## 2025-06-20 DIAGNOSIS — Z12.5 PROSTATE CANCER SCREENING: ICD-10-CM

## 2025-06-20 DIAGNOSIS — E78.00 HYPERCHOLESTEREMIA: ICD-10-CM

## 2025-06-20 DIAGNOSIS — N52.9 ERECTILE DYSFUNCTION, UNSPECIFIED ERECTILE DYSFUNCTION TYPE: ICD-10-CM

## 2025-06-20 DIAGNOSIS — Z00.00 WELL ADULT EXAM: Primary | ICD-10-CM

## 2025-06-20 LAB
ALBUMIN SERPL-MCNC: 4.7 G/DL (ref 3.4–5)
ALBUMIN/GLOB SERPL: 1.7 {RATIO} (ref 1.1–2.2)
ALP SERPL-CCNC: 71 U/L (ref 40–129)
ALT SERPL-CCNC: 34 U/L (ref 10–40)
ANION GAP SERPL CALCULATED.3IONS-SCNC: 10 MMOL/L (ref 3–16)
AST SERPL-CCNC: 23 U/L (ref 15–37)
BASOPHILS # BLD: 0.1 K/UL (ref 0–0.2)
BASOPHILS NFR BLD: 1.1 %
BILIRUB SERPL-MCNC: 0.8 MG/DL (ref 0–1)
BUN SERPL-MCNC: 15 MG/DL (ref 7–20)
CALCIUM SERPL-MCNC: 9.7 MG/DL (ref 8.3–10.6)
CHLORIDE SERPL-SCNC: 101 MMOL/L (ref 99–110)
CHOLEST SERPL-MCNC: 228 MG/DL (ref 0–199)
CO2 SERPL-SCNC: 26 MMOL/L (ref 21–32)
CREAT SERPL-MCNC: 0.7 MG/DL (ref 0.9–1.3)
DEPRECATED RDW RBC AUTO: 12.6 % (ref 12.4–15.4)
EOSINOPHIL # BLD: 0.4 K/UL (ref 0–0.6)
EOSINOPHIL NFR BLD: 6.3 %
EST. AVERAGE GLUCOSE BLD GHB EST-MCNC: 119.8 MG/DL
GFR SERPLBLD CREATININE-BSD FMLA CKD-EPI: >90 ML/MIN/{1.73_M2}
GLUCOSE SERPL-MCNC: 112 MG/DL (ref 70–99)
HBA1C MFR BLD: 5.8 %
HCT VFR BLD AUTO: 46.2 % (ref 40.5–52.5)
HDLC SERPL-MCNC: 59 MG/DL (ref 40–60)
HGB BLD-MCNC: 15.8 G/DL (ref 13.5–17.5)
LDLC SERPL CALC-MCNC: 151 MG/DL
LYMPHOCYTES # BLD: 1.9 K/UL (ref 1–5.1)
LYMPHOCYTES NFR BLD: 28.9 %
MCH RBC QN AUTO: 31.4 PG (ref 26–34)
MCHC RBC AUTO-ENTMCNC: 34.2 G/DL (ref 31–36)
MCV RBC AUTO: 91.8 FL (ref 80–100)
MONOCYTES # BLD: 0.7 K/UL (ref 0–1.3)
MONOCYTES NFR BLD: 10.6 %
NEUTROPHILS # BLD: 3.5 K/UL (ref 1.7–7.7)
NEUTROPHILS NFR BLD: 53.1 %
PLATELET # BLD AUTO: 252 K/UL (ref 135–450)
PMV BLD AUTO: 8.1 FL (ref 5–10.5)
POTASSIUM SERPL-SCNC: 4.5 MMOL/L (ref 3.5–5.1)
PROT SERPL-MCNC: 7.5 G/DL (ref 6.4–8.2)
PSA SERPL DL<=0.01 NG/ML-MCNC: 0.37 NG/ML (ref 0–4)
RBC # BLD AUTO: 5.03 M/UL (ref 4.2–5.9)
SODIUM SERPL-SCNC: 137 MMOL/L (ref 136–145)
TRIGL SERPL-MCNC: 89 MG/DL (ref 0–150)
TSH SERPL DL<=0.005 MIU/L-ACNC: 1.38 UIU/ML (ref 0.27–4.2)
VLDLC SERPL CALC-MCNC: 18 MG/DL
WBC # BLD AUTO: 6.6 K/UL (ref 4–11)

## 2025-06-20 PROCEDURE — 90677 PCV20 VACCINE IM: CPT

## 2025-06-20 PROCEDURE — 90474 IMMUNE ADMIN ORAL/NASAL ADDL: CPT

## 2025-06-20 PROCEDURE — 90471 IMMUNIZATION ADMIN: CPT

## 2025-06-20 PROCEDURE — 99396 PREV VISIT EST AGE 40-64: CPT

## 2025-06-20 RX ORDER — SILDENAFIL 100 MG/1
100 TABLET, FILM COATED ORAL DAILY PRN
Qty: 30 TABLET | Refills: 3 | Status: SHIPPED | OUTPATIENT
Start: 2025-06-20

## 2025-06-20 NOTE — PROGRESS NOTES
Subjective     Axel Car is a 59 y.o. male and is here for a comprehensive physical exam. The patient reports no problems. Does have the left knee brace after the knee injury. Managed by the orthopedics currently. [place] and therefore, today will be the last encounter with us.    Patient Active Problem List   Diagnosis    Eczema    Allergic rhinitis    Knee pain, right    Ulcer    Scrotal skin lesion    Bradycardia    Tear of meniscus of knee    ADRIAN (obstructive sleep apnea)     Past Medical History:   Diagnosis Date    Colon polyp 05/01/2018    COLONOSCOPY, DR DEDE JEONG, SIGMOID COLON POLYP:  COLONIC MUCOSA WITH LYMPHOCYTIC AGGREGATES.     Rectal polyp 05/01/2018    COLONOSCOPY, DR DEDE JEONG, SIGMOID COLON POLYP:  COLONIC MUCOSA WITH LYMPHOCYTIC AGGREGATES.  RECTUM POLYP:  MUCOSAL PROLAPSE POLYP.    Ulcer     stomach     Current Outpatient Medications on File Prior to Visit   Medication Sig Dispense Refill    albuterol sulfate HFA (VENTOLIN HFA) 108 (90 Base) MCG/ACT inhaler Inhale 2 puffs into the lungs every 4 hours as needed for Wheezing or Shortness of Breath 18 g 0    levocetirizine (XYZAL) 5 MG tablet Take 1 tablet by mouth nightly 90 tablet 1     No current facility-administered medications on file prior to visit.       Dental: Last visit ~6 months ago  Vision: Last visit ~6 months ago     History:  Date last PSA: 6/5/2024    Any STD's in the past? none   Sexual partners/marriage:wife    Last Colonoscopy:7/15/2024  Immunizations: Due for Prevnar-20    Patient's medications, allergies, past medical, surgical, social and family histories were reviewed and updated as appropriate.    SH:  Tobacco: no  E-cigarette use: no  EtOH: no  Drugs: no  Lives with: wife  IPV: no concern    Review of Systems  Review of Systems  Per HPI    Objective   Vitals:    06/20/25 0838   BP: 120/62   Pulse: 50   SpO2: 97%     Physical Exam  Vitals reviewed.

## 2025-06-20 NOTE — PROGRESS NOTES
Immunizations Administered       Name Date Dose Route    Pneumococcal, PCV20, PREVNAR 20, (age 6w+), IM, 0.5mL 6/20/2025 0.5 mL Intramuscular    Site: Deltoid- Left    Lot: [id number]    NDC: 6862-9268-04

## 2025-06-21 ENCOUNTER — RESULTS FOLLOW-UP (OUTPATIENT)
Dept: URGENT CARE | Age: 60
End: 2025-06-21

## 2025-07-26 DIAGNOSIS — J30.2 SEASONAL ALLERGIC RHINITIS, UNSPECIFIED TRIGGER: ICD-10-CM

## 2025-07-28 RX ORDER — LEVOCETIRIZINE DIHYDROCHLORIDE 5 MG/1
5 TABLET, FILM COATED ORAL NIGHTLY
Qty: 90 TABLET | Refills: 0 | Status: SHIPPED | OUTPATIENT
Start: 2025-07-28